# Patient Record
Sex: MALE | Race: WHITE | NOT HISPANIC OR LATINO | Employment: FULL TIME | URBAN - METROPOLITAN AREA
[De-identification: names, ages, dates, MRNs, and addresses within clinical notes are randomized per-mention and may not be internally consistent; named-entity substitution may affect disease eponyms.]

---

## 2023-04-24 LAB
NIL(NEG) CONTROL SPOT COUNT: NORMAL
PANEL A SPOT COUNT: 1
PANEL B SPOT COUNT: 0
POS CONTROL SPOT COUNT: NORMAL
T-SPOT. TB INTERPRETATION: NEGATIVE

## 2023-05-24 LAB
APPEARANCE, URINE: CLEAR
BILIRUBIN, URINE: NEGATIVE
BLOOD, URINE: ABNORMAL
COLOR, URINE: YELLOW
GLUCOSE, URINE: NEGATIVE MG/DL
KETONES, URINE: NEGATIVE MG/DL
LEUKOCYTE ESTERASE, URINE: ABNORMAL
NITRITE, URINE: NEGATIVE
PH, URINE: 6 (ref 5–8)
PROTEIN, URINE: ABNORMAL MG/DL
RBC, URINE: 5 /HPF (ref 0–5)
SPECIFIC GRAVITY, URINE: 1.01 (ref 1–1.03)
UROBILINOGEN, URINE: <2 MG/DL (ref 0–1.9)
WBC, URINE: 14 /HPF (ref 0–5)

## 2023-05-31 LAB
APPEARANCE, URINE: CLEAR
BILIRUBIN, URINE: NEGATIVE
BLOOD, URINE: NEGATIVE
COLOR, URINE: YELLOW
GLUCOSE, URINE: NEGATIVE MG/DL
KETONES, URINE: NEGATIVE MG/DL
LEUKOCYTE ESTERASE, URINE: ABNORMAL
MUCUS, URINE: ABNORMAL /LPF
NITRITE, URINE: NEGATIVE
PH, URINE: 7 (ref 5–8)
PROTEIN, URINE: NEGATIVE MG/DL
RBC, URINE: 3 /HPF (ref 0–5)
SPECIFIC GRAVITY, URINE: 1.01 (ref 1–1.03)
UROBILINOGEN, URINE: <2 MG/DL (ref 0–1.9)
WBC, URINE: 15 /HPF (ref 0–5)

## 2023-06-07 LAB
APPEARANCE, URINE: CLEAR
BILIRUBIN, URINE: NEGATIVE
BLOOD, URINE: ABNORMAL
COLOR, URINE: YELLOW
GLUCOSE, URINE: NEGATIVE MG/DL
KETONES, URINE: NEGATIVE MG/DL
LEUKOCYTE ESTERASE, URINE: ABNORMAL
NITRITE, URINE: NEGATIVE
PH, URINE: 7 (ref 5–8)
PROTEIN, URINE: NEGATIVE MG/DL
RBC, URINE: 12 /HPF (ref 0–5)
SPECIFIC GRAVITY, URINE: 1.01 (ref 1–1.03)
SQUAMOUS EPITHELIAL CELLS, URINE: <1 /HPF
UROBILINOGEN, URINE: <2 MG/DL (ref 0–1.9)
WBC, URINE: 19 /HPF (ref 0–5)

## 2023-06-14 LAB
APPEARANCE, URINE: CLEAR
BILIRUBIN, URINE: NEGATIVE
BLOOD, URINE: ABNORMAL
COLOR, URINE: YELLOW
GLUCOSE, URINE: NEGATIVE MG/DL
KETONES, URINE: NEGATIVE MG/DL
LEUKOCYTE ESTERASE, URINE: ABNORMAL
MUCUS, URINE: ABNORMAL /LPF
NITRITE, URINE: NEGATIVE
PH, URINE: 7 (ref 5–8)
PROTEIN, URINE: NEGATIVE MG/DL
RBC, URINE: 18 /HPF (ref 0–5)
SPECIFIC GRAVITY, URINE: 1 (ref 1–1.03)
SQUAMOUS EPITHELIAL CELLS, URINE: <1 /HPF
UROBILINOGEN, URINE: <2 MG/DL (ref 0–1.9)
WBC, URINE: 20 /HPF (ref 0–5)

## 2023-06-21 LAB
APPEARANCE, URINE: CLEAR
BILIRUBIN, URINE: NEGATIVE
BLOOD, URINE: ABNORMAL
COLOR, URINE: YELLOW
GLUCOSE, URINE: NEGATIVE MG/DL
KETONES, URINE: NEGATIVE MG/DL
LEUKOCYTE ESTERASE, URINE: ABNORMAL
NITRITE, URINE: NEGATIVE
PH, URINE: 8 (ref 5–8)
PROTEIN, URINE: NEGATIVE MG/DL
RBC, URINE: 6 /HPF (ref 0–5)
SPECIFIC GRAVITY, URINE: 1 (ref 1–1.03)
UROBILINOGEN, URINE: <2 MG/DL (ref 0–1.9)
WBC, URINE: 11 /HPF (ref 0–5)

## 2023-06-28 LAB
APPEARANCE, URINE: CLEAR
BILIRUBIN, URINE: NEGATIVE
BLOOD, URINE: ABNORMAL
COLOR, URINE: YELLOW
GLUCOSE, URINE: NEGATIVE MG/DL
KETONES, URINE: NEGATIVE MG/DL
LEUKOCYTE ESTERASE, URINE: ABNORMAL
NITRITE, URINE: NEGATIVE
PH, URINE: 6 (ref 5–8)
PROTEIN, URINE: NEGATIVE MG/DL
RBC, URINE: 141 /HPF (ref 0–5)
SPECIFIC GRAVITY, URINE: 1.01 (ref 1–1.03)
UROBILINOGEN, URINE: <2 MG/DL (ref 0–1.9)
WBC, URINE: 30 /HPF (ref 0–5)

## 2023-08-14 LAB
ALANINE AMINOTRANSFERASE (SGPT) (U/L) IN SER/PLAS: 26 U/L (ref 10–52)
ALBUMIN (G/DL) IN SER/PLAS: 5 G/DL (ref 3.4–5)
ALKALINE PHOSPHATASE (U/L) IN SER/PLAS: 86 U/L (ref 33–120)
ANION GAP IN SER/PLAS: 14 MMOL/L (ref 10–20)
ASPARTATE AMINOTRANSFERASE (SGOT) (U/L) IN SER/PLAS: 22 U/L (ref 9–39)
BILIRUBIN TOTAL (MG/DL) IN SER/PLAS: 0.6 MG/DL (ref 0–1.2)
CALCIUM (MG/DL) IN SER/PLAS: 9.7 MG/DL (ref 8.6–10.6)
CARBON DIOXIDE, TOTAL (MMOL/L) IN SER/PLAS: 29 MMOL/L (ref 21–32)
CHLORIDE (MMOL/L) IN SER/PLAS: 102 MMOL/L (ref 98–107)
CREATININE (MG/DL) IN SER/PLAS: 0.57 MG/DL (ref 0.5–1.3)
ERYTHROCYTE DISTRIBUTION WIDTH (RATIO) BY AUTOMATED COUNT: 12.2 % (ref 11.5–14.5)
ERYTHROCYTE MEAN CORPUSCULAR HEMOGLOBIN CONCENTRATION (G/DL) BY AUTOMATED: 34.6 G/DL (ref 32–36)
ERYTHROCYTE MEAN CORPUSCULAR VOLUME (FL) BY AUTOMATED COUNT: 103 FL (ref 80–100)
ERYTHROCYTES (10*6/UL) IN BLOOD BY AUTOMATED COUNT: 4.74 X10E12/L (ref 4.5–5.9)
GFR MALE: >90 ML/MIN/1.73M2
GLUCOSE (MG/DL) IN SER/PLAS: 85 MG/DL (ref 74–99)
HEMATOCRIT (%) IN BLOOD BY AUTOMATED COUNT: 48.6 % (ref 41–52)
HEMOGLOBIN (G/DL) IN BLOOD: 16.8 G/DL (ref 13.5–17.5)
LEUKOCYTES (10*3/UL) IN BLOOD BY AUTOMATED COUNT: 8.8 X10E9/L (ref 4.4–11.3)
NRBC (PER 100 WBCS) BY AUTOMATED COUNT: 0 /100 WBC (ref 0–0)
PLATELETS (10*3/UL) IN BLOOD AUTOMATED COUNT: 231 X10E9/L (ref 150–450)
POTASSIUM (MMOL/L) IN SER/PLAS: 4.2 MMOL/L (ref 3.5–5.3)
PROTEIN TOTAL: 7.7 G/DL (ref 6.4–8.2)
SODIUM (MMOL/L) IN SER/PLAS: 141 MMOL/L (ref 136–145)
UREA NITROGEN (MG/DL) IN SER/PLAS: 10 MG/DL (ref 6–23)

## 2023-09-27 PROBLEM — R10.9 FLANK PAIN: Status: ACTIVE | Noted: 2023-09-27

## 2023-09-27 PROBLEM — C68.9 UROTHELIAL CARCINOMA (MULTI): Status: ACTIVE | Noted: 2023-09-27

## 2023-09-27 PROBLEM — N32.89 BLADDER MASS: Status: ACTIVE | Noted: 2023-09-27

## 2023-09-27 PROBLEM — I10 SYSTOLIC HYPERTENSION: Status: ACTIVE | Noted: 2023-09-27

## 2023-09-27 PROBLEM — N20.0 CALCULUS OF KIDNEY: Status: ACTIVE | Noted: 2023-09-27

## 2023-09-27 PROBLEM — R31.0 GROSS HEMATURIA: Status: ACTIVE | Noted: 2023-09-27

## 2023-09-27 PROBLEM — N32.81 OVERACTIVE BLADDER: Status: ACTIVE | Noted: 2023-09-27

## 2023-09-27 RX ORDER — LOSARTAN POTASSIUM 100 MG/1
100 TABLET ORAL DAILY
COMMUNITY

## 2023-09-27 RX ORDER — AMLODIPINE BESYLATE 10 MG/1
10 TABLET ORAL DAILY
COMMUNITY

## 2023-09-27 RX ORDER — CHLORHEXIDINE GLUCONATE ORAL RINSE 1.2 MG/ML
15 SOLUTION DENTAL
Status: ON HOLD | COMMUNITY
End: 2023-10-02

## 2023-09-27 RX ORDER — CHLORHEXIDINE GLUCONATE 40 MG/ML
SOLUTION TOPICAL
Status: ON HOLD | COMMUNITY
End: 2023-10-02

## 2023-09-27 RX ORDER — METOPROLOL SUCCINATE 50 MG/1
50 TABLET, EXTENDED RELEASE ORAL DAILY
COMMUNITY

## 2023-10-01 ENCOUNTER — HOSPITAL ENCOUNTER (INPATIENT)
Facility: HOSPITAL | Age: 58
LOS: 5 days | Discharge: HOME | DRG: 863 | End: 2023-10-06
Attending: EMERGENCY MEDICINE | Admitting: STUDENT IN AN ORGANIZED HEALTH CARE EDUCATION/TRAINING PROGRAM
Payer: COMMERCIAL

## 2023-10-01 ENCOUNTER — APPOINTMENT (OUTPATIENT)
Dept: RADIOLOGY | Facility: HOSPITAL | Age: 58
DRG: 863 | End: 2023-10-01
Payer: COMMERCIAL

## 2023-10-01 DIAGNOSIS — N32.89 BLADDER MASS: ICD-10-CM

## 2023-10-01 DIAGNOSIS — R50.82 POSTSURGICAL FEVER: ICD-10-CM

## 2023-10-01 DIAGNOSIS — T81.89XA LYMPHOCELE AFTER SURGICAL PROCEDURE: Primary | ICD-10-CM

## 2023-10-01 DIAGNOSIS — R78.81 BACTEREMIA: ICD-10-CM

## 2023-10-01 DIAGNOSIS — I89.8 LYMPHOCELE AFTER SURGICAL PROCEDURE: Primary | ICD-10-CM

## 2023-10-01 LAB
ALBUMIN SERPL BCP-MCNC: 3.3 G/DL (ref 3.4–5)
ANION GAP SERPL CALC-SCNC: 17 MMOL/L
APPEARANCE UR: ABNORMAL
BACTERIA #/AREA URNS AUTO: ABNORMAL /HPF
BASOPHILS # BLD AUTO: 0.04 X10*3/UL (ref 0–0.1)
BASOPHILS NFR BLD AUTO: 0.3 %
BILIRUB UR STRIP.AUTO-MCNC: NEGATIVE MG/DL
BUN SERPL-MCNC: 7 MG/DL (ref 6–23)
CALCIUM SERPL-MCNC: 8.5 MG/DL (ref 8.6–10.6)
CHLORIDE SERPL-SCNC: 95 MMOL/L (ref 98–107)
CO2 SERPL-SCNC: 21 MMOL/L (ref 21–32)
COLOR UR: ABNORMAL
CREAT SERPL-MCNC: 0.68 MG/DL (ref 0.5–1.3)
EOSINOPHIL # BLD AUTO: 0.01 X10*3/UL (ref 0–0.7)
EOSINOPHIL NFR BLD AUTO: 0.1 %
ERYTHROCYTE [DISTWIDTH] IN BLOOD BY AUTOMATED COUNT: 11.4 % (ref 11.5–14.5)
FLUAV RNA RESP QL NAA+PROBE: NOT DETECTED
FLUBV RNA RESP QL NAA+PROBE: NOT DETECTED
GFR SERPL CREATININE-BSD FRML MDRD: >90 ML/MIN/1.73M*2
GLUCOSE SERPL-MCNC: 96 MG/DL (ref 74–99)
GLUCOSE UR STRIP.AUTO-MCNC: NEGATIVE MG/DL
HCT VFR BLD AUTO: 36.1 % (ref 41–52)
HGB BLD-MCNC: 13.1 G/DL (ref 13.5–17.5)
IMM GRANULOCYTES # BLD AUTO: 0.09 X10*3/UL (ref 0–0.7)
IMM GRANULOCYTES NFR BLD AUTO: 0.7 % (ref 0–0.9)
KETONES UR STRIP.AUTO-MCNC: ABNORMAL MG/DL
LEUKOCYTE ESTERASE UR QL STRIP.AUTO: ABNORMAL
LYMPHOCYTES # BLD AUTO: 0.45 X10*3/UL (ref 1.2–4.8)
LYMPHOCYTES NFR BLD AUTO: 3.7 %
MCH RBC QN AUTO: 34.7 PG (ref 26–34)
MCHC RBC AUTO-ENTMCNC: 36.3 G/DL (ref 32–36)
MCV RBC AUTO: 96 FL (ref 80–100)
MONOCYTES # BLD AUTO: 0.7 X10*3/UL (ref 0.1–1)
MONOCYTES NFR BLD AUTO: 5.8 %
MUCOUS THREADS #/AREA URNS AUTO: ABNORMAL /LPF
NEUTROPHILS # BLD AUTO: 10.79 X10*3/UL (ref 1.2–7.7)
NEUTROPHILS NFR BLD AUTO: 89.4 %
NITRITE UR QL STRIP.AUTO: NEGATIVE
NRBC BLD-RTO: 0 /100 WBCS (ref 0–0)
PH UR STRIP.AUTO: 6 [PH]
PHOSPHATE SERPL-MCNC: 2.1 MG/DL (ref 2.5–4.9)
PLATELET # BLD AUTO: 372 X10*3/UL (ref 150–450)
PMV BLD AUTO: 8.6 FL (ref 7.5–11.5)
POTASSIUM SERPL-SCNC: 4.4 MMOL/L (ref 3.5–5.3)
PROT UR STRIP.AUTO-MCNC: ABNORMAL MG/DL
RBC # BLD AUTO: 3.78 X10*6/UL (ref 4.5–5.9)
RBC # UR STRIP.AUTO: ABNORMAL /UL
RBC #/AREA URNS AUTO: >20 /HPF
SARS-COV-2 ORF1AB RESP QL NAA+PROBE: NOT DETECTED
SODIUM SERPL-SCNC: 129 MMOL/L (ref 136–145)
SP GR UR STRIP.AUTO: 1.02
UROBILINOGEN UR STRIP.AUTO-MCNC: 2 MG/DL
WBC # BLD AUTO: 12.1 X10*3/UL (ref 4.4–11.3)
WBC #/AREA URNS AUTO: ABNORMAL /HPF

## 2023-10-01 PROCEDURE — 2580000001 HC RX 258 IV SOLUTIONS: Performed by: STUDENT IN AN ORGANIZED HEALTH CARE EDUCATION/TRAINING PROGRAM

## 2023-10-01 PROCEDURE — 81001 URINALYSIS AUTO W/SCOPE: CPT

## 2023-10-01 PROCEDURE — 2500000004 HC RX 250 GENERAL PHARMACY W/ HCPCS (ALT 636 FOR OP/ED)

## 2023-10-01 PROCEDURE — 2500000001 HC RX 250 WO HCPCS SELF ADMINISTERED DRUGS (ALT 637 FOR MEDICARE OP): Performed by: STUDENT IN AN ORGANIZED HEALTH CARE EDUCATION/TRAINING PROGRAM

## 2023-10-01 PROCEDURE — 2580000001 HC RX 258 IV SOLUTIONS

## 2023-10-01 PROCEDURE — 74177 CT ABD & PELVIS W/CONTRAST: CPT | Performed by: RADIOLOGY

## 2023-10-01 PROCEDURE — 71046 X-RAY EXAM CHEST 2 VIEWS: CPT | Performed by: RADIOLOGY

## 2023-10-01 PROCEDURE — 87040 BLOOD CULTURE FOR BACTERIA: CPT | Performed by: EMERGENCY MEDICINE

## 2023-10-01 PROCEDURE — 36415 COLL VENOUS BLD VENIPUNCTURE: CPT | Performed by: EMERGENCY MEDICINE

## 2023-10-01 PROCEDURE — 2500000001 HC RX 250 WO HCPCS SELF ADMINISTERED DRUGS (ALT 637 FOR MEDICARE OP)

## 2023-10-01 PROCEDURE — 87186 SC STD MICRODIL/AGAR DIL: CPT

## 2023-10-01 PROCEDURE — 2500000004 HC RX 250 GENERAL PHARMACY W/ HCPCS (ALT 636 FOR OP/ED): Performed by: STUDENT IN AN ORGANIZED HEALTH CARE EDUCATION/TRAINING PROGRAM

## 2023-10-01 PROCEDURE — 1170000001 HC PRIVATE ONCOLOGY ROOM DAILY

## 2023-10-01 PROCEDURE — 74177 CT ABD & PELVIS W/CONTRAST: CPT

## 2023-10-01 PROCEDURE — 96374 THER/PROPH/DIAG INJ IV PUSH: CPT | Mod: 59

## 2023-10-01 PROCEDURE — 87635 SARS-COV-2 COVID-19 AMP PRB: CPT

## 2023-10-01 PROCEDURE — 96360 HYDRATION IV INFUSION INIT: CPT

## 2023-10-01 PROCEDURE — 71046 X-RAY EXAM CHEST 2 VIEWS: CPT | Mod: FY

## 2023-10-01 PROCEDURE — 87636 SARSCOV2 & INF A&B AMP PRB: CPT

## 2023-10-01 PROCEDURE — 99285 EMERGENCY DEPT VISIT HI MDM: CPT | Mod: 25

## 2023-10-01 PROCEDURE — 80069 RENAL FUNCTION PANEL: CPT

## 2023-10-01 PROCEDURE — 99285 EMERGENCY DEPT VISIT HI MDM: CPT | Performed by: EMERGENCY MEDICINE

## 2023-10-01 PROCEDURE — 2550000001 HC RX 255 CONTRASTS: Performed by: EMERGENCY MEDICINE

## 2023-10-01 PROCEDURE — 99284 EMERGENCY DEPT VISIT MOD MDM: CPT | Performed by: EMERGENCY MEDICINE

## 2023-10-01 PROCEDURE — 85025 COMPLETE CBC W/AUTO DIFF WBC: CPT

## 2023-10-01 PROCEDURE — 36415 COLL VENOUS BLD VENIPUNCTURE: CPT

## 2023-10-01 RX ORDER — AMLODIPINE BESYLATE 10 MG/1
10 TABLET ORAL DAILY
Status: DISCONTINUED | OUTPATIENT
Start: 2023-10-01 | End: 2023-10-06 | Stop reason: HOSPADM

## 2023-10-01 RX ORDER — DOCUSATE SODIUM 100 MG/1
100 CAPSULE, LIQUID FILLED ORAL 2 TIMES DAILY
Status: DISCONTINUED | OUTPATIENT
Start: 2023-10-01 | End: 2023-10-06 | Stop reason: HOSPADM

## 2023-10-01 RX ORDER — METOPROLOL SUCCINATE 50 MG/1
50 TABLET, EXTENDED RELEASE ORAL DAILY
Status: DISCONTINUED | OUTPATIENT
Start: 2023-10-01 | End: 2023-10-06 | Stop reason: HOSPADM

## 2023-10-01 RX ORDER — VANCOMYCIN HYDROCHLORIDE 750 MG/150ML
1500 INJECTION, SOLUTION INTRAVENOUS ONCE
Status: COMPLETED | OUTPATIENT
Start: 2023-10-01 | End: 2023-10-01

## 2023-10-01 RX ORDER — ENOXAPARIN SODIUM 100 MG/ML
40 INJECTION SUBCUTANEOUS EVERY 12 HOURS
Status: DISCONTINUED | OUTPATIENT
Start: 2023-10-01 | End: 2023-10-06

## 2023-10-01 RX ORDER — ACETAMINOPHEN 325 MG/1
650 TABLET ORAL EVERY 4 HOURS PRN
Status: DISCONTINUED | OUTPATIENT
Start: 2023-10-01 | End: 2023-10-06 | Stop reason: HOSPADM

## 2023-10-01 RX ORDER — LOSARTAN POTASSIUM 50 MG/1
100 TABLET ORAL DAILY
Status: DISCONTINUED | OUTPATIENT
Start: 2023-10-01 | End: 2023-10-06 | Stop reason: HOSPADM

## 2023-10-01 RX ORDER — SODIUM CHLORIDE, SODIUM LACTATE, POTASSIUM CHLORIDE, CALCIUM CHLORIDE 600; 310; 30; 20 MG/100ML; MG/100ML; MG/100ML; MG/100ML
100 INJECTION, SOLUTION INTRAVENOUS CONTINUOUS
Status: DISCONTINUED | OUTPATIENT
Start: 2023-10-01 | End: 2023-10-03

## 2023-10-01 RX ORDER — POLYETHYLENE GLYCOL 3350 17 G/17G
17 POWDER, FOR SOLUTION ORAL DAILY
Status: DISCONTINUED | OUTPATIENT
Start: 2023-10-01 | End: 2023-10-06 | Stop reason: HOSPADM

## 2023-10-01 RX ADMIN — SODIUM CHLORIDE, POTASSIUM CHLORIDE, SODIUM LACTATE AND CALCIUM CHLORIDE 100 ML/HR: 600; 310; 30; 20 INJECTION, SOLUTION INTRAVENOUS at 20:32

## 2023-10-01 RX ADMIN — DOCUSATE SODIUM 100 MG: 100 CAPSULE, LIQUID FILLED ORAL at 20:16

## 2023-10-01 RX ADMIN — IOHEXOL 75 ML: 350 INJECTION, SOLUTION INTRAVENOUS at 17:02

## 2023-10-01 RX ADMIN — POLYETHYLENE GLYCOL 3350 17 G: 17 POWDER, FOR SOLUTION ORAL at 18:10

## 2023-10-01 RX ADMIN — PIPERACILLIN SODIUM AND TAZOBACTAM SODIUM 3.38 G: 3; .375 INJECTION, SOLUTION INTRAVENOUS at 17:44

## 2023-10-01 RX ADMIN — SODIUM CHLORIDE, POTASSIUM CHLORIDE, SODIUM LACTATE AND CALCIUM CHLORIDE 1000 ML: 600; 310; 30; 20 INJECTION, SOLUTION INTRAVENOUS at 16:11

## 2023-10-01 RX ADMIN — AMLODIPINE BESYLATE 10 MG: 10 TABLET ORAL at 18:10

## 2023-10-01 RX ADMIN — LOSARTAN POTASSIUM 100 MG: 50 TABLET, FILM COATED ORAL at 18:10

## 2023-10-01 RX ADMIN — VANCOMYCIN HYDROCHLORIDE 1500 MG: 750 INJECTION, SOLUTION INTRAVENOUS at 18:13

## 2023-10-01 RX ADMIN — METOPROLOL SUCCINATE 50 MG: 50 TABLET, EXTENDED RELEASE ORAL at 18:10

## 2023-10-01 RX ADMIN — ENOXAPARIN SODIUM 40 MG: 40 INJECTION SUBCUTANEOUS at 18:10

## 2023-10-01 RX ADMIN — ACETAMINOPHEN 650 MG: 325 TABLET, FILM COATED ORAL at 18:10

## 2023-10-01 SDOH — SOCIAL STABILITY: SOCIAL INSECURITY: ABUSE: ADULT

## 2023-10-01 SDOH — SOCIAL STABILITY: SOCIAL INSECURITY: HAS ANYONE EVER THREATENED TO HURT YOUR FAMILY OR YOUR PETS?: NO

## 2023-10-01 SDOH — SOCIAL STABILITY: SOCIAL INSECURITY: ARE THERE ANY APPARENT SIGNS OF INJURIES/BEHAVIORS THAT COULD BE RELATED TO ABUSE/NEGLECT?: NO

## 2023-10-01 SDOH — SOCIAL STABILITY: SOCIAL INSECURITY: DOES ANYONE TRY TO KEEP YOU FROM HAVING/CONTACTING OTHER FRIENDS OR DOING THINGS OUTSIDE YOUR HOME?: NO

## 2023-10-01 SDOH — SOCIAL STABILITY: SOCIAL INSECURITY: HAVE YOU HAD THOUGHTS OF HARMING ANYONE ELSE?: NO

## 2023-10-01 SDOH — SOCIAL STABILITY: SOCIAL INSECURITY: ARE YOU OR HAVE YOU BEEN THREATENED OR ABUSED PHYSICALLY, EMOTIONALLY, OR SEXUALLY BY ANYONE?: NO

## 2023-10-01 SDOH — SOCIAL STABILITY: SOCIAL INSECURITY: DO YOU FEEL ANYONE HAS EXPLOITED OR TAKEN ADVANTAGE OF YOU FINANCIALLY OR OF YOUR PERSONAL PROPERTY?: NO

## 2023-10-01 SDOH — SOCIAL STABILITY: SOCIAL INSECURITY: DO YOU FEEL UNSAFE GOING BACK TO THE PLACE WHERE YOU ARE LIVING?: NO

## 2023-10-01 ASSESSMENT — ACTIVITIES OF DAILY LIVING (ADL)
JUDGMENT_ADEQUATE_SAFELY_COMPLETE_DAILY_ACTIVITIES: YES
GROOMING: INDEPENDENT
BATHING: INDEPENDENT
HEARING - RIGHT EAR: FUNCTIONAL
ADEQUATE_TO_COMPLETE_ADL: YES
GROOMING: INDEPENDENT
JUDGMENT_ADEQUATE_SAFELY_COMPLETE_DAILY_ACTIVITIES: YES
ADEQUATE_TO_COMPLETE_ADL: YES
DRESSING YOURSELF: INDEPENDENT
TOILETING: INDEPENDENT
TOILETING: INDEPENDENT
PATIENT'S MEMORY ADEQUATE TO SAFELY COMPLETE DAILY ACTIVITIES?: YES
FEEDING YOURSELF: INDEPENDENT
WALKS IN HOME: INDEPENDENT
FEEDING YOURSELF: INDEPENDENT
HEARING - LEFT EAR: FUNCTIONAL
WALKS IN HOME: INDEPENDENT
DRESSING YOURSELF: INDEPENDENT
HEARING - LEFT EAR: FUNCTIONAL
BATHING: INDEPENDENT
PATIENT'S MEMORY ADEQUATE TO SAFELY COMPLETE DAILY ACTIVITIES?: YES
HEARING - RIGHT EAR: FUNCTIONAL

## 2023-10-01 ASSESSMENT — COGNITIVE AND FUNCTIONAL STATUS - GENERAL
PATIENT BASELINE BEDBOUND: NO
MOBILITY SCORE: 22
CLIMB 3 TO 5 STEPS WITH RAILING: A LITTLE
WALKING IN HOSPITAL ROOM: A LITTLE
DAILY ACTIVITIY SCORE: 24

## 2023-10-01 ASSESSMENT — PATIENT HEALTH QUESTIONNAIRE - PHQ9
SUM OF ALL RESPONSES TO PHQ9 QUESTIONS 1 & 2: 0
2. FEELING DOWN, DEPRESSED OR HOPELESS: NOT AT ALL
1. LITTLE INTEREST OR PLEASURE IN DOING THINGS: NOT AT ALL

## 2023-10-01 ASSESSMENT — COLUMBIA-SUICIDE SEVERITY RATING SCALE - C-SSRS
6. HAVE YOU EVER DONE ANYTHING, STARTED TO DO ANYTHING, OR PREPARED TO DO ANYTHING TO END YOUR LIFE?: NO
1. IN THE PAST MONTH, HAVE YOU WISHED YOU WERE DEAD OR WISHED YOU COULD GO TO SLEEP AND NOT WAKE UP?: NO
2. HAVE YOU ACTUALLY HAD ANY THOUGHTS OF KILLING YOURSELF?: NO

## 2023-10-01 ASSESSMENT — ENCOUNTER SYMPTOMS
FEVER: 1
HEMATURIA: 1
CHILLS: 1
NEUROLOGICAL NEGATIVE: 1
RESPIRATORY NEGATIVE: 1
GASTROINTESTINAL NEGATIVE: 1

## 2023-10-01 ASSESSMENT — LIFESTYLE VARIABLES
HOW MANY STANDARD DRINKS CONTAINING ALCOHOL DO YOU HAVE ON A TYPICAL DAY: PATIENT DOES NOT DRINK
AUDIT-C TOTAL SCORE: 0
HOW OFTEN DO YOU HAVE 6 OR MORE DRINKS ON ONE OCCASION: NEVER
SKIP TO QUESTIONS 9-10: 1
HOW OFTEN DO YOU HAVE A DRINK CONTAINING ALCOHOL: NEVER
AUDIT-C TOTAL SCORE: 0

## 2023-10-01 ASSESSMENT — PAIN - FUNCTIONAL ASSESSMENT
PAIN_FUNCTIONAL_ASSESSMENT: 0-10
PAIN_FUNCTIONAL_ASSESSMENT: 0-10

## 2023-10-01 ASSESSMENT — PAIN SCALES - GENERAL: PAINLEVEL_OUTOF10: 0 - NO PAIN

## 2023-10-01 NOTE — ED TRIAGE NOTES
S/p Bladder removal 9/20. Pt bernabe fever that started this morning and hematuria in urostomy that started yesterday

## 2023-10-01 NOTE — ED PROVIDER NOTES
HPI   Chief Complaint   Patient presents with    Fever       Patient is a 57-year-old male with past medical history of HTN, HLD, OA, nephrolithiasis now postop day 10 from TURBT with cystectomy and ileal conduit creation presenting with postsurgical fever.  Patient endorses he was generally doing well postsurgical but took his temperature at home that was 102.4.  Patient otherwise endorses discoloration of the urine in his stoma bag but no other current symptoms.  Patient does not endorse chills, malaise, cough, congestion, nausea, abdominal pain, or other symptoms.          History provided by:  Patient and spouse   used: No                        No data recorded                Patient History   No past medical history on file.  No past surgical history on file.  No family history on file.  Social History     Tobacco Use    Smoking status: Not on file    Smokeless tobacco: Not on file   Substance Use Topics    Alcohol use: Not on file    Drug use: Not on file       Physical Exam   ED Triage Vitals [10/01/23 1225]   Temp Heart Rate Resp BP   36.7 °C (98.1 °F) 99 18 130/82      SpO2 Temp Source Heart Rate Source Patient Position   96 % Oral Monitor Sitting      BP Location FiO2 (%)     Left arm --       Physical Exam  Constitutional:       General: He is not in acute distress.     Appearance: Normal appearance. He is not ill-appearing.   HENT:      Head: Normocephalic and atraumatic.      Nose: No congestion or rhinorrhea.      Mouth/Throat:      Mouth: Mucous membranes are moist.   Eyes:      Extraocular Movements: Extraocular movements intact.   Cardiovascular:      Rate and Rhythm: Normal rate and regular rhythm.      Pulses: Normal pulses.   Pulmonary:      Effort: Pulmonary effort is normal. No respiratory distress.      Breath sounds: Normal breath sounds. No stridor. No wheezing or rhonchi.   Abdominal:      General: There is no distension.      Palpations: Abdomen is soft.       Tenderness: There is no abdominal tenderness. There is no guarding or rebound.      Comments: 1 stage ileal conduit in place.  Reddish color of stoma site reportedly unchanged.  Tea-colored possibly blood-tinged urine in stoma bag.  Bilateral conduits appropriately extending into bag.  Patient otherwise with no tenderness palpation in abdomen.  No CVA tenderness.  Negative Chaidez sign.   Musculoskeletal:         General: Normal range of motion.   Skin:     General: Skin is warm and dry.      Capillary Refill: Capillary refill takes less than 2 seconds.   Neurological:      General: No focal deficit present.      Mental Status: He is alert and oriented to person, place, and time.   Psychiatric:         Mood and Affect: Mood normal.         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  Patient with change in urine color suspicious for UTI postsurgical infection but otherwise does not have obvious source of infection.  Patient with no symptoms of respiratory infection but screened with flu, COVID that were negative and chest x-ray that was evidence of pneumonia.  Blood cultures were sent which were pending at time of admission.  Urinalysis was sent from [facility] with moderate blood, moderate leuk esterase but difficult to interpret as unable to straight cath patient given 1 stage ileal conduit.  Urology was consulted for postsurgical recommendations and CT abdomen pelvis was obtained which was significant for abdominal fluid collection of undetermined sterility.  Given concern for inability to rule out postsurgical abscess/infection, patient was started on broad-spectrum antibiotics of piperacillin/tazobactam and vancomycin at urology recommendation.  Urology recommended admission for further observation  and patient mated to their service.        Procedure  Procedures     Andres Ray MD  Resident  10/02/23 5056

## 2023-10-01 NOTE — CONSULTS
"Reason For Consult  POD10 cystectomy with fever and increased hematuria    History Of Present Illness  Rolando Eason is a 57 y.o. male POD10 following robotic cystectomy w/ IC creation w/ Dr. Merrill on 9/20 presenting with recorded fever of 38.3 and increased hematuria. Patient was originally discharged home on POD4 with no concerns and is scheduled to follow up with Dr. Merrill on 10/4. Urology was consulted for recommendations in workup.     Patient states that he began to feel fatigued yesterday evening and struggled sleeping overnight. This am the patient had fevers with mild nausea. Denies vomiting, abdominal pain. Patient presented to the ED and has cont'd to be fatigued with occasional fevers. Still denies abdominal pain.      Past Medical History  HTN, HLD, OA, kidney stones, bladder ca    Surgical History  TURBT, cystectomy     Social History  He has no history on file for tobacco use, alcohol use, and drug use.    Family History  No family history on file.     Allergies  Lisinopril    Review of Systems  Review of Systems   Constitutional:  Positive for chills and fever.   HENT: Negative.     Respiratory: Negative.     Gastrointestinal: Negative.    Genitourinary:  Positive for hematuria.   Skin: Negative.    Neurological: Negative.          Physical Exam  General: Laying in bed. NAD.   Eyes: EOMI  ENMT: no apparent injury, no lesions seen, MMM  Head/neck: NCAT  Cardiac: regular rate in chart  Pulm: normal respiratory effort  GI: soft, NT/ND, no masses palpated  :   Msk: [place]GAD[place]O  Extremities: normal extremities  Skin: warm, dry, no lesions noted  Neuro: AOx3  Psych: appropriate mood and behavior      Last Recorded Vitals  Blood pressure 158/86, pulse 84, temperature 38.3 °C (100.9 °F), resp. rate 16, height 1.753 m (5' 9\"), weight 90.7 kg (200 lb), SpO2 96 %.    Relevant Results  Results for orders placed or performed during the hospital encounter of 10/01/23 (from the past 24 hour(s))   CBC and Auto " Differential   Result Value Ref Range    WBC 12.1 (H) 4.4 - 11.3 x10*3/uL    nRBC 0.0 0.0 - 0.0 /100 WBCs    RBC 3.78 (L) 4.50 - 5.90 x10*6/uL    Hemoglobin 13.1 (L) 13.5 - 17.5 g/dL    Hematocrit 36.1 (L) 41.0 - 52.0 %    MCV 96 80 - 100 fL    MCH 34.7 (H) 26.0 - 34.0 pg    MCHC 36.3 (H) 32.0 - 36.0 g/dL    RDW 11.4 (L) 11.5 - 14.5 %    Platelets 372 150 - 450 x10*3/uL    MPV 8.6 7.5 - 11.5 fL    Neutrophils % 89.4 40.0 - 80.0 %    Immature Granulocytes %, Automated 0.7 0.0 - 0.9 %    Lymphocytes % 3.7 13.0 - 44.0 %    Monocytes % 5.8 2.0 - 10.0 %    Eosinophils % 0.1 0.0 - 6.0 %    Basophils % 0.3 0.0 - 2.0 %    Neutrophils Absolute 10.79 (H) 1.20 - 7.70 x10*3/uL    Immature Granulocytes Absolute, Automated 0.09 0.00 - 0.70 x10*3/uL    Lymphocytes Absolute 0.45 (L) 1.20 - 4.80 x10*3/uL    Monocytes Absolute 0.70 0.10 - 1.00 x10*3/uL    Eosinophils Absolute 0.01 0.00 - 0.70 x10*3/uL    Basophils Absolute 0.04 0.00 - 0.10 x10*3/uL   Renal function panel   Result Value Ref Range    Glucose 96 74 - 99 mg/dL    Sodium 129 (L) 136 - 145 mmol/L    Potassium 4.4 3.5 - 5.3 mmol/L    Chloride 95 (L) 98 - 107 mmol/L    Bicarbonate 21 21 - 32 mmol/L    Anion Gap 17 mmol/L    Urea Nitrogen 7 6 - 23 mg/dL    Creatinine 0.68 0.50 - 1.30 mg/dL    eGFR >90 >60 mL/min/1.73m*2    Calcium 8.5 (L) 8.6 - 10.6 mg/dL    Phosphorus 2.1 (L) 2.5 - 4.9 mg/dL    Albumin 3.3 (L) 3.4 - 5.0 g/dL   Urinalysis with Reflex Microscopic   Result Value Ref Range    Color, Urine Angie (N) Straw, Yellow    Appearance, Urine Hazy (N) Clear    Specific Gravity, Urine 1.019 1.005 - 1.035    pH, Urine 6.0 5.0, 5.5, 6.0, 6.5, 7.0, 7.5, 8.0    Protein, Urine 100 (2+) (N) NEGATIVE mg/dL    Glucose, Urine NEGATIVE NEGATIVE mg/dL    Blood, Urine MODERATE (2+) (A) NEGATIVE    Ketones, Urine 20 (1+) (A) NEGATIVE mg/dL    Bilirubin, Urine NEGATIVE NEGATIVE    Urobilinogen, Urine 2.0 (N) <2.0 mg/dL    Nitrite, Urine NEGATIVE NEGATIVE    Leukocyte Esterase, Urine  MODERATE (2+) (A) NEGATIVE   Urinalysis Microscopic Only   Result Value Ref Range    WBC, Urine 21-50 (A) 1-5, NONE /HPF    RBC, Urine >20 (A) NONE, 1-2, 3-5 /HPF    Bacteria, Urine 1+ (A) NONE SEEN /HPF    Mucus, Urine 4+ Reference range not established. /LPF   Sars-CoV-2 PCR, Symptomatic   Result Value Ref Range    Coronavirus 2019, PCR Not Detected Not Detected   Influenza A, and B PCR   Result Value Ref Range    Flu A Result Not Detected Not Detected    Flu B Result Not Detected Not Detected           Assessment/Plan     Rolando Eason is a 57 y.o. male POD10 following robotic cystectomy w/ IC creation w/ Dr. Merrill on 9/20 presenting with recorded fever of 38.3 and increased hematuria. Patient was originally discharged home on POD4 with no concerns and is scheduled to follow up with Dr. Merrill on 10/4. Urology was consulted for recommendations in workup.     CT abdomen/pelvis (10/1): Mildly rim enhancing fluid collection within the left hemipelvis measuring up to 4.3 cm immediately inferior left iliac bifurcation between the external iliac artery and vein, favored to represent a lymphocele. However, the sterility of the fluid connection cannot be determined by this exam alone.    Plan:  - Admit to urology for IVF and IV abx  - Pain control w/ tylenol  - Cont home losartan, amlodipine, metoprolol  - Reg diet  - Bowel regimen  - IVF 100ml/hr LR  - Start vanc/zosyn  - Cont lovenox ppx  - AM labs ordered    Discussed with attending, Dr. Garfield William MD

## 2023-10-01 NOTE — ED PROVIDER NOTES
I performed a history and physical examination of Rolando Eason and discussed his management with Dr. Ray.  I agree with the history, physical, assessment, and plan of care, with the following exceptions: None    I was present for the following procedures: None  Time Spent in Critical Care of the patient: None  Time spent in discussions with the patient and family: 35    57-year-old male with a history of bladder cancer status post cystectomy September 20, presents with fever at home.  He otherwise has no symptoms.  Denies any cough, congestion, sore throat, nausea vomiting or diarrhea, skin rashes, neck pain or neck stiffness.  ROS otherwise negative.  States he is not getting any chemo or radiation, not otherwise immunocompromised.  Has been having normal bowel movements.  Did note blood-tinged urine over the last 2 days, this is new for him.  Denies any discomfort in his belly.  On my exam patient is well-appearing in no acute distress.  No nuchal rigidity.  No edema or exudate to posterior oropharynx.  Clear to auscultation.  Abdomen soft nontender.  Ileostomy mucosa is pink and patent with clear blood-tinged urine.  Sepsis labs including blood cultures were ordered.  We will hold off on empiric antibiotics since patient is not immunocompromised and there is no clear source until we discussed with urology.    Alexander Becerra MD MPH       Alexander Becerra MD MPH  10/01/23 3399

## 2023-10-02 LAB
ANION GAP SERPL CALC-SCNC: 16 MMOL/L (ref 10–20)
BUN SERPL-MCNC: 6 MG/DL (ref 6–23)
CALCIUM SERPL-MCNC: 7.9 MG/DL (ref 8.6–10.6)
CHLORIDE SERPL-SCNC: 98 MMOL/L (ref 98–107)
CO2 SERPL-SCNC: 21 MMOL/L (ref 21–32)
CREAT SERPL-MCNC: 0.53 MG/DL (ref 0.5–1.3)
ERYTHROCYTE [DISTWIDTH] IN BLOOD BY AUTOMATED COUNT: 11.3 % (ref 11.5–14.5)
GFR SERPL CREATININE-BSD FRML MDRD: >90 ML/MIN/1.73M*2
GLUCOSE SERPL-MCNC: 97 MG/DL (ref 74–99)
HCT VFR BLD AUTO: 33.7 % (ref 41–52)
HGB BLD-MCNC: 12 G/DL (ref 13.5–17.5)
MCH RBC QN AUTO: 34.3 PG (ref 26–34)
MCHC RBC AUTO-ENTMCNC: 35.6 G/DL (ref 32–36)
MCV RBC AUTO: 96 FL (ref 80–100)
NRBC BLD-RTO: 0 /100 WBCS (ref 0–0)
PLATELET # BLD AUTO: 355 X10*3/UL (ref 150–450)
PMV BLD AUTO: 9.3 FL (ref 7.5–11.5)
POTASSIUM SERPL-SCNC: 3.8 MMOL/L (ref 3.5–5.3)
RBC # BLD AUTO: 3.5 X10*6/UL (ref 4.5–5.9)
SODIUM SERPL-SCNC: 131 MMOL/L (ref 136–145)
VANCOMYCIN SERPL-MCNC: 5.6 UG/ML (ref 5–20)
WBC # BLD AUTO: 8.2 X10*3/UL (ref 4.4–11.3)

## 2023-10-02 PROCEDURE — 1170000001 HC PRIVATE ONCOLOGY ROOM DAILY

## 2023-10-02 PROCEDURE — 36415 COLL VENOUS BLD VENIPUNCTURE: CPT | Performed by: STUDENT IN AN ORGANIZED HEALTH CARE EDUCATION/TRAINING PROGRAM

## 2023-10-02 PROCEDURE — 2500000001 HC RX 250 WO HCPCS SELF ADMINISTERED DRUGS (ALT 637 FOR MEDICARE OP)

## 2023-10-02 PROCEDURE — 85027 COMPLETE CBC AUTOMATED: CPT | Performed by: STUDENT IN AN ORGANIZED HEALTH CARE EDUCATION/TRAINING PROGRAM

## 2023-10-02 PROCEDURE — 2500000001 HC RX 250 WO HCPCS SELF ADMINISTERED DRUGS (ALT 637 FOR MEDICARE OP): Performed by: STUDENT IN AN ORGANIZED HEALTH CARE EDUCATION/TRAINING PROGRAM

## 2023-10-02 PROCEDURE — 96372 THER/PROPH/DIAG INJ SC/IM: CPT | Performed by: STUDENT IN AN ORGANIZED HEALTH CARE EDUCATION/TRAINING PROGRAM

## 2023-10-02 PROCEDURE — 82947 ASSAY GLUCOSE BLOOD QUANT: CPT | Performed by: STUDENT IN AN ORGANIZED HEALTH CARE EDUCATION/TRAINING PROGRAM

## 2023-10-02 PROCEDURE — 2500000004 HC RX 250 GENERAL PHARMACY W/ HCPCS (ALT 636 FOR OP/ED): Performed by: STUDENT IN AN ORGANIZED HEALTH CARE EDUCATION/TRAINING PROGRAM

## 2023-10-02 PROCEDURE — 2500000004 HC RX 250 GENERAL PHARMACY W/ HCPCS (ALT 636 FOR OP/ED)

## 2023-10-02 PROCEDURE — 80202 ASSAY OF VANCOMYCIN: CPT

## 2023-10-02 RX ORDER — ENOXAPARIN SODIUM 100 MG/ML
40 INJECTION SUBCUTANEOUS DAILY
COMMUNITY
Start: 2023-09-25 | End: 2023-10-23

## 2023-10-02 RX ORDER — BETAMETHASONE DIPROPIONATE 0.5 MG/G
1 OINTMENT TOPICAL DAILY PRN
COMMUNITY

## 2023-10-02 RX ORDER — ACETAMINOPHEN 325 MG/1
325 TABLET ORAL EVERY 6 HOURS PRN
COMMUNITY

## 2023-10-02 RX ORDER — ACETAMINOPHEN 325 MG/1
975 TABLET ORAL EVERY 6 HOURS PRN
Status: DISCONTINUED | OUTPATIENT
Start: 2023-10-02 | End: 2023-10-02

## 2023-10-02 RX ORDER — ACETAMINOPHEN 325 MG/1
650 TABLET ORAL EVERY 6 HOURS PRN
Status: DISCONTINUED | OUTPATIENT
Start: 2023-10-02 | End: 2023-10-05

## 2023-10-02 RX ADMIN — ENOXAPARIN SODIUM 40 MG: 40 INJECTION SUBCUTANEOUS at 05:50

## 2023-10-02 RX ADMIN — METOPROLOL SUCCINATE 50 MG: 50 TABLET, EXTENDED RELEASE ORAL at 17:22

## 2023-10-02 RX ADMIN — ENOXAPARIN SODIUM 40 MG: 40 INJECTION SUBCUTANEOUS at 17:23

## 2023-10-02 RX ADMIN — PIPERACILLIN SODIUM AND TAZOBACTAM SODIUM 3.38 G: 3; .375 INJECTION, SOLUTION INTRAVENOUS at 00:08

## 2023-10-02 RX ADMIN — VANCOMYCIN HYDROCHLORIDE 1500 MG: 1.5 INJECTION, POWDER, LYOPHILIZED, FOR SOLUTION INTRAVENOUS at 21:00

## 2023-10-02 RX ADMIN — AMLODIPINE BESYLATE 10 MG: 10 TABLET ORAL at 17:23

## 2023-10-02 RX ADMIN — ACETAMINOPHEN 650 MG: 325 TABLET, FILM COATED ORAL at 02:11

## 2023-10-02 RX ADMIN — VANCOMYCIN HYDROCHLORIDE 1250 MG: 1.25 INJECTION, POWDER, LYOPHILIZED, FOR SOLUTION INTRAVENOUS at 09:05

## 2023-10-02 RX ADMIN — PIPERACILLIN SODIUM AND TAZOBACTAM SODIUM 3.38 G: 3; .375 INJECTION, SOLUTION INTRAVENOUS at 17:23

## 2023-10-02 RX ADMIN — PIPERACILLIN SODIUM AND TAZOBACTAM SODIUM 3.38 G: 3; .375 INJECTION, SOLUTION INTRAVENOUS at 23:22

## 2023-10-02 RX ADMIN — LOSARTAN POTASSIUM 100 MG: 50 TABLET, FILM COATED ORAL at 17:22

## 2023-10-02 RX ADMIN — PIPERACILLIN SODIUM AND TAZOBACTAM SODIUM 3.38 G: 3; .375 INJECTION, SOLUTION INTRAVENOUS at 10:40

## 2023-10-02 ASSESSMENT — COGNITIVE AND FUNCTIONAL STATUS - GENERAL
DAILY ACTIVITIY SCORE: 24
MOBILITY SCORE: 24

## 2023-10-02 ASSESSMENT — PAIN - FUNCTIONAL ASSESSMENT
PAIN_FUNCTIONAL_ASSESSMENT: 0-10

## 2023-10-02 ASSESSMENT — PAIN SCALES - GENERAL
PAINLEVEL_OUTOF10: 4
PAINLEVEL_OUTOF10: 4
PAINLEVEL_OUTOF10: 3

## 2023-10-02 NOTE — PROGRESS NOTES
"Rolando Eason is a 57 y.o. male on day 1 of admission presenting with Postsurgical fever.    Subjective   No acute events overnight. Documented fever of 38.8 overnight. Patient reports feeling improved. Patient denies n/v, fever/chills, cp/sob, and worsening abdominal pain.         Objective     Physical Exam  General: Laying in bed. NAD.   Eyes: EOMI  ENMT: no apparent injury, no lesions seen, MMM  Head/neck: NCAT  Cardiac: regular rate in chart  Pulm: normal respiratory effort  GI: soft, NT, moderately distended, ostomy viable with stents in place, lap incisions stapled  : marta colored urine  Msk: DELGADILLO  Extremities: normal extremities  Skin: warm, dry, no lesions noted  Neuro: AOx3  Psych: appropriate mood and behavior     Last Recorded Vitals  Blood pressure 122/85, pulse 102, temperature 36.1 °C (97 °F), temperature source Temporal, resp. rate 18, height 1.753 m (5' 9.02\"), weight 90.7 kg (199 lb 15.3 oz), SpO2 95 %.  Intake/Output last 3 Shifts:  I/O last 3 completed shifts:  In: 946.7 (10.4 mL/kg) [I.V.:946.7 (10.4 mL/kg)]  Out: 200 (2.2 mL/kg) [Urine:200 (0.1 mL/kg/hr)]  Weight: 90.7 kg     Relevant Results  Scheduled medications  amLODIPine, 10 mg, oral, Daily  docusate sodium, 100 mg, oral, BID  enoxaparin, 40 mg, subcutaneous, q12h  losartan, 100 mg, oral, Daily  metoprolol succinate XL, 50 mg, oral, Daily  piperacillin-tazobactam, 3.375 g, intravenous, q6h  polyethylene glycol, 17 g, oral, Daily  vancomycin, 1,250 mg, intravenous, q12h      Continuous medications  lactated Ringer's, 100 mL/hr, Last Rate: 100 mL/hr (10/01/23 2200)      PRN medications  PRN medications: acetaminophen    Results for orders placed or performed during the hospital encounter of 10/01/23 (from the past 24 hour(s))   CBC and Auto Differential   Result Value Ref Range    WBC 12.1 (H) 4.4 - 11.3 x10*3/uL    nRBC 0.0 0.0 - 0.0 /100 WBCs    RBC 3.78 (L) 4.50 - 5.90 x10*6/uL    Hemoglobin 13.1 (L) 13.5 - 17.5 g/dL    Hematocrit " 36.1 (L) 41.0 - 52.0 %    MCV 96 80 - 100 fL    MCH 34.7 (H) 26.0 - 34.0 pg    MCHC 36.3 (H) 32.0 - 36.0 g/dL    RDW 11.4 (L) 11.5 - 14.5 %    Platelets 372 150 - 450 x10*3/uL    MPV 8.6 7.5 - 11.5 fL    Neutrophils % 89.4 40.0 - 80.0 %    Immature Granulocytes %, Automated 0.7 0.0 - 0.9 %    Lymphocytes % 3.7 13.0 - 44.0 %    Monocytes % 5.8 2.0 - 10.0 %    Eosinophils % 0.1 0.0 - 6.0 %    Basophils % 0.3 0.0 - 2.0 %    Neutrophils Absolute 10.79 (H) 1.20 - 7.70 x10*3/uL    Immature Granulocytes Absolute, Automated 0.09 0.00 - 0.70 x10*3/uL    Lymphocytes Absolute 0.45 (L) 1.20 - 4.80 x10*3/uL    Monocytes Absolute 0.70 0.10 - 1.00 x10*3/uL    Eosinophils Absolute 0.01 0.00 - 0.70 x10*3/uL    Basophils Absolute 0.04 0.00 - 0.10 x10*3/uL   Renal function panel   Result Value Ref Range    Glucose 96 74 - 99 mg/dL    Sodium 129 (L) 136 - 145 mmol/L    Potassium 4.4 3.5 - 5.3 mmol/L    Chloride 95 (L) 98 - 107 mmol/L    Bicarbonate 21 21 - 32 mmol/L    Anion Gap 17 mmol/L    Urea Nitrogen 7 6 - 23 mg/dL    Creatinine 0.68 0.50 - 1.30 mg/dL    eGFR >90 >60 mL/min/1.73m*2    Calcium 8.5 (L) 8.6 - 10.6 mg/dL    Phosphorus 2.1 (L) 2.5 - 4.9 mg/dL    Albumin 3.3 (L) 3.4 - 5.0 g/dL   Urinalysis with Reflex Microscopic   Result Value Ref Range    Color, Urine Angie (N) Straw, Yellow    Appearance, Urine Hazy (N) Clear    Specific Gravity, Urine 1.019 1.005 - 1.035    pH, Urine 6.0 5.0, 5.5, 6.0, 6.5, 7.0, 7.5, 8.0    Protein, Urine 100 (2+) (N) NEGATIVE mg/dL    Glucose, Urine NEGATIVE NEGATIVE mg/dL    Blood, Urine MODERATE (2+) (A) NEGATIVE    Ketones, Urine 20 (1+) (A) NEGATIVE mg/dL    Bilirubin, Urine NEGATIVE NEGATIVE    Urobilinogen, Urine 2.0 (N) <2.0 mg/dL    Nitrite, Urine NEGATIVE NEGATIVE    Leukocyte Esterase, Urine MODERATE (2+) (A) NEGATIVE   Urinalysis Microscopic Only   Result Value Ref Range    WBC, Urine 21-50 (A) 1-5, NONE /HPF    RBC, Urine >20 (A) NONE, 1-2, 3-5 /HPF    Bacteria, Urine 1+ (A) NONE SEEN  /HPF    Mucus, Urine 4+ Reference range not established. /LPF   Sars-CoV-2 PCR, Symptomatic   Result Value Ref Range    Coronavirus 2019, PCR Not Detected Not Detected   Influenza A, and B PCR   Result Value Ref Range    Flu A Result Not Detected Not Detected    Flu B Result Not Detected Not Detected   Blood Culture    Specimen: Peripheral Venipuncture; Blood culture   Result Value Ref Range    Blood Culture Loaded on Instrument - Culture in progress    Blood Culture    Specimen: Peripheral Venipuncture; Blood culture   Result Value Ref Range    Blood Culture Loaded on Instrument - Culture in progress    CBC   Result Value Ref Range    WBC 8.2 4.4 - 11.3 x10*3/uL    nRBC 0.0 0.0 - 0.0 /100 WBCs    RBC 3.50 (L) 4.50 - 5.90 x10*6/uL    Hemoglobin 12.0 (L) 13.5 - 17.5 g/dL    Hematocrit 33.7 (L) 41.0 - 52.0 %    MCV 96 80 - 100 fL    MCH 34.3 (H) 26.0 - 34.0 pg    MCHC 35.6 32.0 - 36.0 g/dL    RDW 11.3 (L) 11.5 - 14.5 %    Platelets 355 150 - 450 x10*3/uL    MPV 9.3 7.5 - 11.5 fL   Basic metabolic panel   Result Value Ref Range    Glucose 97 74 - 99 mg/dL    Sodium 131 (L) 136 - 145 mmol/L    Potassium 3.8 3.5 - 5.3 mmol/L    Chloride 98 98 - 107 mmol/L    Bicarbonate 21 21 - 32 mmol/L    Anion Gap 16 10 - 20 mmol/L    Urea Nitrogen 6 6 - 23 mg/dL    Creatinine 0.53 0.50 - 1.30 mg/dL    eGFR >90 >60 mL/min/1.73m*2    Calcium 7.9 (L) 8.6 - 10.6 mg/dL   Vancomycin   Result Value Ref Range    Vancomycin 5.6 5.0 - 20.0 ug/mL       CT abdomen pelvis w IV contrast    Result Date: 10/1/2023  Interpreted By:  Car Segovia and Meyers Emily STUDY: CT ABDOMEN PELVIS W IV CONTRAST;  10/1/2023 5:01 pm   INDICATION: Signs/Symptoms:s/p cystectomy w/ ileal conduit, c/f intraabdominal infectious processess (note: use of hemostatic agents in bladder bed intraoperatively).   COMPARISON: CT urography 08/30/2023   ACCESSION NUMBER(S): [id number]   ORDERING CLINICIAN: ROSALBA BOTELLO   TECHNIQUE: CT of the abdomen and  pelvis was performed following administration of intravenous contrast. Standard contiguous axial images were obtained at 3 mm slice thickness through the abdomen and pelvis. Coronal and sagittal reconstructions at 3 mm slice thickness were performed.   FINDINGS: LOWER CHEST: Streaky, linear bibasilar opacities, likely representing atelectasis versus scarring. Small calcified granulomas in the bilateral lobes. The heart is normal in size without pericardial effusion. No pleural effusion is present. Visualized distal esophagus appears normal.   ABDOMEN:   LIVER: The liver is normal in size without evidence of focal liver lesions.   BILE DUCTS: The intrahepatic and extrahepatic ducts are not dilated.   GALLBLADDER: The gallbladder is nondistended and without evidence of radiopaque stones.   PANCREAS: The pancreas appears unremarkable without evidence of ductal dilatation or masses.   SPLEEN: The spleen is normal in size without focal lesions.   ADRENAL GLANDS: Bilateral adrenal glands appear normal.   KIDNEYS AND URETERS: The kidneys are normal in size and enhance symmetrically. Stents are noted extending from the anterior abdominal wall through the ileal conduit and terminating within the renal pelvis bilaterally. A small subcentimeter hypodensities noted within the right kidney, which is too small to characterize, however, statistically likely represents a renal cyst. No hydroureteronephrosis or nephroureterolithiasis is identified.   PELVIS:   BLADDER: Interval postsurgical change of cystectomy with formation of an ileal conduit.   REPRODUCTIVE ORGANS: No pelvic masses.   BOWEL: The stomach is unremarkable. Interval postsurgical change of an ileal conduit. There is gradual tapering to several distended fluid-filled loops of small bowel measuring up to 3.6 cm without evidence of discrete transition point.  The appendix appears normal.   VESSELS: There is no aneurysmal dilatation of the abdominal aorta. The IVC  appears normal.   PERITONEUM/RETROPERITONEUM/LYMPH NODES: Free fluid layering adjacent to the liver, spleen, and within the pelvis, likely postsurgical in nature. In addition, there is interval development of a mildly rim enhancing fluid collection within the left hemipelvis measuring up to 4.3 x 3.1 x 3.9 cm (series 201, image 121; series 203, image 73). The fluid collection lies immediately inferior to the bifurcation of the internal and external iliac artery between the external iliac artery and external iliac vein.  No abdominopelvic lymphadenopathy is present.   BONES AND ABDOMINAL WALL: No suspicious osseous lesions are identified.  Postsurgical change along the anterior abdominal wall with increased body wall edema. In addition, there is subcutaneous emphysema extending along the right lateral abdominal wall and anteriorly along the pelvis. Small fat containing umbilical hernia. Stable right groin lipoma measuring up to 3.1 cm.       1.  Interval postsurgical change of cystectomy with ileal conduit formation. 2. Mildly rim enhancing fluid collection within the left hemipelvis measuring up to 4.3 cm immediately inferior left iliac bifurcation between the external iliac artery and vein, favored to represent a lymphocele. However, the sterility of the fluid connection cannot be determined by this exam alone. 3. Gradual tapering of several fluid-filled and distended loops of small bowel measuring up to 3.6 cm, likely suggestive of postoperative ileus. 4. Free fluid layering adjacent to the liver, spleen, and within the pelvis, likely postsurgical in nature. 5. Additional findings as detailed above.   I personally reviewed the images/study, and I agree with the findings as stated above. This study was interpreted at [facility], [employer], [place].   MACRO: None   Signed by: Car Guzman 10/1/2023 5:19 PM Dictation workstation:   [id number]    XR chest 2  views    Result Date: 10/1/2023  Interpreted By:  New Menendez, STUDY: XR CHEST 2 VIEWS   INDICATION: Signs/Symptoms:fever.   COMPARISON: September 15, 2023   ACCESSION NUMBER(S): [id number]   ORDERING CLINICIAN: RILEY HASSAN   FINDINGS: No consolidation, effusion, edema, or pneumothorax.   Small band of atelectasis noted only seen on the lateral view.   Heart size within normal limits.       Small band of mid lung atelectasis. No evidence of other acute intrathoracic abnormality.   Signed by: New Menendez 10/1/2023 2:26 PM Dictation workstation:   [id number]    XR abdomen AP view    Result Date: 9/21/2023  Interpreted By:  BRIAN TROTTER MD and JIMMY CORLEY MD MRN: [id number] Patient Name: DEEPIKA SANTOS  STUDY: ABDOMEN; 1 VIEW;  9/20/2023 5:14 pm  INDICATION: assess bilateral stent placement .  COMPARISON: CT abdomen pelvis 08/30/2023  ACCESSION NUMBER(S): [id number]  ORDERING CLINICIAN: SCOOBY LOWE  FINDINGS: AP radiograph of the abdomen was provided.  Bilateral ureteral stents are in place with proximal portions overlying the expected regions of the renal pelvises. A surgical drain overlies the pelvis.  Nonobstructive bowel gas pattern. Limited evaluation of pneumoperitoneum on supine imaging, however no gross evidence of free air is noted.  Osseous structures demonstrate no acute bony changes.      1.  Bilateral ureteral stents in place with proximal portions overlying the expected regions of the renal pelvises. 2. Nonobstructive bowel gas pattern.  I personally reviewed the images/study and I agree with the findings as stated. This study was interpreted at [place], [place].  MACRO: None    XR chest 2 view    Result Date: 9/15/2023  Interpreted By:  BRY MCDONALD MD and DEENA GILLIAM MD MRN: [id number] Patient Name: DEEPIKA SANTOS  STUDY: TH CHEST 2 VIEW PA AND LAT; ;  9/15/2023 10:47 am  INDICATION: tobacco use  COMPARISON: CT chest on 08/30/2023   ACCESSION NUMBER(S): [id number]  ORDERING CLINICIAN: MARTY BOLAÑOS  TECHNIQUE: PA and lateral chest radiographs are submitted for review. Additional PA dual energy images were also provided.  FINDINGS: LINES/TUBES/DEVICES: None  LUNGS: Lungs are clear. No focal consolidation, pleural effusion, or pneumothorax.  CARDIOMEDIASTINAL SILHOUETTE: Cardiomediastinal silhouette is normal in size and configuration. Calcification of the aortic knob.  ABDOMEN: No remarkable upper abdominal findings.  BONES: No acute osseous changes.      No radiographic evidence of acute cardiopulmonary process.   I personally reviewed the images/study and I agree with radiology resident Dr. Francesca Lewis's findings as stated. This study was interpreted at [place], [place]         Assessment/Plan   Rolando Eason is a 57 y.o. male POD10 following robotic cystectomy w/ IC creation w/ Dr. Bolaños on 9/20 presenting with recorded fever of 38.3 and increased hematuria. Patient was originally discharged home on POD4 with no concerns and is scheduled to follow up with Dr. Bolaños on 10/4. CT shows left sided pelvic lymphocele. Patient started on IVF and IV abx, will fu cultures.    Neuro:   - Scheduled Tylenol    Cardiac:  - Cont home losartan, metoprolol, amlodipine  - Maintain MAP >65  - Continue to monitor BP                                                                                                                                                                               Pulm:   - Encourage IS  - Maintain O2 >88%    GI:  - Reg diet  - Bowel regimen  - PRN Zofran for nausea    :   - IVF 100ml/hr LR  - Stoma stents in place  - Daily BMP  - Strict I&Os  - Replete lytes PRN    HEME:  - Trend CBC daily   - Transfusion not indicated at this time    Endo:   - No acute or chronic    ID:  - Fever to 38.8 overnight  - FU Ucx and Bcx  - Cont Zosyn (start date 10/1)  - Cont Vancomycin (start date  10/1)  - Monitor for s/sx of infection     DVT Ppx:  - SCDs  - Lovenox 40mg    Dispo: RNF    Discussed with attending, Dr. Garfield William MD

## 2023-10-02 NOTE — ED NOTES
Pharmacy Medication History Review    Rolando Eason is a 57 y.o. male admitted for Postsurgical fever. Pharmacy reviewed the patient's hgazr-[id number] medications and allergies for accuracy.    The list below reflectives the updated PTA list. Please review each medication in order reconciliation for additional clarification and justification.  Medications Prior to Admission   Medication Sig Dispense Refill Last Dose    acetaminophen (Tylenol) 325 mg tablet Take 1 tablet (325 mg) by mouth every 6 hours if needed for fever (temp greater than 38.0 C).   Past Week    amLODIPine (Norvasc) 10 mg tablet Take 1 tablet (10 mg) by mouth once daily.   10/1/2023 at 1800    betamethasone dipropionate (Diprolene) 0.05 % ointment Apply 1 Application topically once daily as needed for rash.   Past Month    enoxaparin (Lovenox) 40 mg/0.4 mL syringe Inject 0.4 mL (40 mg) under the skin once daily.   10/1/2023    losartan (Cozaar) 100 mg tablet Take 1 tablet (100 mg) by mouth once daily.   10/1/2023 at 1800    metoprolol succinate XL (Toprol-XL) 50 mg 24 hr tablet Take 1 tablet (50 mg) by mouth once daily. Do not crush or chew.   10/1/2023 at 1800        The list below reflectives the updated allergy list. Please review each documented allergy for additional clarification and justification.  Allergies  Reviewed by Nancy Negrete PharmD on 10/2/2023        Severity Reactions Comments    Lisinopril Not Specified Cough           Sources: Patient interview, Lidia, [facility] office visit 8/14/23, [facility] admission 9/20/23    Below are additional concerns with the patient's PTA list.  Patient started enoxaparin 40 mg once daily after surgery on 9/25/23  Patient previously using Miralax at home, but states he has been having loose stools the past ~2 days so will likely not continue it    Nancy Rodriguez PharmD

## 2023-10-02 NOTE — CARE PLAN
The patient's goals for the shift include  to not fall by the end of the shift    The clinical goals for the shift include to closely monitor the fevers    Over the shift, the patient did not make progress toward the following goals. Barriers to progression include utilizing call light.

## 2023-10-02 NOTE — CARE PLAN
The patient's goals for the shift include  not falling by the end of the shift.    The clinical goals for the shift include to closely monitor the fevers    Over the shift, the patient did not make progress toward the following goals.

## 2023-10-02 NOTE — PROGRESS NOTES
Vancomycin Dosing by Pharmacy- FOLLOW UP    Rolando Eason is a 57 y.o. year old male who Pharmacy has been consulted for vancomycin dosing for other sepsis/urinary tract infection per notes . Based on the patient's indication and renal status this patient is being dosed based on a goal AUC of 500-600.     Renal function is currently stable.    Current vancomycin dose: 1250 mg given every 12 hours    Estimated vancomycin AUC on current dose: 433 mg/L.hr     Visit Vitals  /85 (BP Location: Right arm, Patient Position: Lying)   Pulse 102   Temp 36.1 °C (97 °F) (Temporal)   Resp 18      I/O last 3 completed shifts:  In: 946.7 (10.4 mL/kg) [I.V.:946.7 (10.4 mL/kg)]  Out: 200 (2.2 mL/kg) [Urine:200 (0.1 mL/kg/hr)]  Weight: 90.7 kg     Lab Results   Component Value Date    PATIENTTEMP 37.0 09/20/2023      Assessment/Plan    Below goal AUC. Orders placed for new vancomcyin regimen of 1500 every 12 hours to begin at 2100.   This dosing regimen is predicted by [employer] to result in the following pharmacokinetic parameters:  AUC24,ss: 519 mg/L.hr  Probability of AUC24 > 400: 85 %  Ctrough,ss: 15.3 mg/L  Probability of Ctrough,ss > 20: 24 %  Probability of nephrotoxicity (Lodise JED 2009): 11 %    The next level will be obtained on 10/4 with AM labs. May be obtained sooner if clinically indicated.   Will continue to monitor renal function daily while on vancomycin and order serum creatinine at least every 48 hours if not already ordered.  Follow for continued vancomycin needs, clinical response, and signs/symptoms of toxicity.     Joann Brito, PharmD

## 2023-10-02 NOTE — PROGRESS NOTES
"Vancomycin Dosing by Pharmacy- INITIAL    Rolando Eason is a 57 y.o. year old male who Pharmacy has been consulted for vancomycin dosing for other sepsis, follow up with day team to clarify indication (not part of initial consult) . Based on the patient's indication and renal status this patient will be dosed based on a goal AUC of 400-600.     Renal function is currently stable.    Visit Vitals  /73 (BP Location: Right arm, Patient Position: Lying)   Pulse 104   Temp 38.8 °C (101.8 °F) (Temporal)   Resp 18        Lab Results   Component Value Date    CREATININE 0.68 10/01/2023    CREATININE 0.66 09/25/2023    CREATININE 0.51 09/24/2023    CREATININE 0.58 09/23/2023    CREATININE 0.67 09/22/2023        Patient weight is No results found for: \"PTWEIGHT\"    No results found for: \"CULTURE\"     No intake/output data recorded.  [unfilled]    Lab Results   Component Value Date    PATIENTTEMP 37.0 09/20/2023          Assessment/Plan     Patient has already been given a loading dose of 1500 mg.  Will initiate vancomycin maintenance,  1250 mg every 12 hours.    This dosing regimen is predicted by [employer] to result in the follow  Loading dose: N/A  Regimen: 1250 mg IV every 12 hours.  Start time: 06:13 on 10/02/2023  Exposure target: AUC24 (range)400-600 mg/L.hr   AUC24,ss: 465 mg/L.hr  Probability of AUC24 > 400: 66 %  Ctrough,ss: 13.8 mg/L  Probability of Ctrough,ss > 20: 23 %  Probability of nephrotoxicity (Lodise JED 2009): 9 %    Follow-up level will be ordered on 10/2 am unless clinically indicated sooner.  Will continue to monitor renal function daily while on vancomycin and order serum creatinine at least every 48 hours if not already ordered.  Follow for continued vancomycin needs, clinical response, and signs/symptoms of toxicity.       Adriana Wade, PharmD       "

## 2023-10-03 LAB
ALBUMIN SERPL BCP-MCNC: 2.9 G/DL (ref 3.4–5)
ANION GAP SERPL CALC-SCNC: 15 MMOL/L (ref 10–20)
BUN SERPL-MCNC: 6 MG/DL (ref 6–23)
CALCIUM SERPL-MCNC: 8.1 MG/DL (ref 8.6–10.6)
CHLORIDE SERPL-SCNC: 94 MMOL/L (ref 98–107)
CO2 SERPL-SCNC: 24 MMOL/L (ref 21–32)
CREAT SERPL-MCNC: 0.49 MG/DL (ref 0.5–1.3)
ERYTHROCYTE [DISTWIDTH] IN BLOOD BY AUTOMATED COUNT: 11.5 % (ref 11.5–14.5)
GFR SERPL CREATININE-BSD FRML MDRD: >90 ML/MIN/1.73M*2
GLUCOSE SERPL-MCNC: 104 MG/DL (ref 74–99)
HCT VFR BLD AUTO: 36 % (ref 41–52)
HGB BLD-MCNC: 12.2 G/DL (ref 13.5–17.5)
MCH RBC QN AUTO: 33.9 PG (ref 26–34)
MCHC RBC AUTO-ENTMCNC: 33.9 G/DL (ref 32–36)
MCV RBC AUTO: 100 FL (ref 80–100)
NRBC BLD-RTO: 0 /100 WBCS (ref 0–0)
PHOSPHATE SERPL-MCNC: 2.6 MG/DL (ref 2.5–4.9)
PLATELET # BLD AUTO: 428 X10*3/UL (ref 150–450)
PMV BLD AUTO: 8.8 FL (ref 7.5–11.5)
POTASSIUM SERPL-SCNC: 3.8 MMOL/L (ref 3.5–5.3)
RBC # BLD AUTO: 3.6 X10*6/UL (ref 4.5–5.9)
SODIUM SERPL-SCNC: 129 MMOL/L (ref 136–145)
VANCOMYCIN SERPL-MCNC: 2.8 UG/ML (ref 5–20)
WBC # BLD AUTO: 8.4 X10*3/UL (ref 4.4–11.3)

## 2023-10-03 PROCEDURE — 85027 COMPLETE CBC AUTOMATED: CPT

## 2023-10-03 PROCEDURE — 80069 RENAL FUNCTION PANEL: CPT

## 2023-10-03 PROCEDURE — 2500000004 HC RX 250 GENERAL PHARMACY W/ HCPCS (ALT 636 FOR OP/ED)

## 2023-10-03 PROCEDURE — 96372 THER/PROPH/DIAG INJ SC/IM: CPT | Performed by: STUDENT IN AN ORGANIZED HEALTH CARE EDUCATION/TRAINING PROGRAM

## 2023-10-03 PROCEDURE — 36415 COLL VENOUS BLD VENIPUNCTURE: CPT

## 2023-10-03 PROCEDURE — 2500000004 HC RX 250 GENERAL PHARMACY W/ HCPCS (ALT 636 FOR OP/ED): Performed by: STUDENT IN AN ORGANIZED HEALTH CARE EDUCATION/TRAINING PROGRAM

## 2023-10-03 PROCEDURE — 2500000001 HC RX 250 WO HCPCS SELF ADMINISTERED DRUGS (ALT 637 FOR MEDICARE OP)

## 2023-10-03 PROCEDURE — 87040 BLOOD CULTURE FOR BACTERIA: CPT

## 2023-10-03 PROCEDURE — 80202 ASSAY OF VANCOMYCIN: CPT

## 2023-10-03 PROCEDURE — 1170000001 HC PRIVATE ONCOLOGY ROOM DAILY

## 2023-10-03 RX ADMIN — PIPERACILLIN SODIUM AND TAZOBACTAM SODIUM 3.38 G: 3; .375 INJECTION, SOLUTION INTRAVENOUS at 18:45

## 2023-10-03 RX ADMIN — LOSARTAN POTASSIUM 100 MG: 50 TABLET, FILM COATED ORAL at 18:04

## 2023-10-03 RX ADMIN — ENOXAPARIN SODIUM 40 MG: 40 INJECTION SUBCUTANEOUS at 04:57

## 2023-10-03 RX ADMIN — PIPERACILLIN SODIUM AND TAZOBACTAM SODIUM 3.38 G: 3; .375 INJECTION, SOLUTION INTRAVENOUS at 11:55

## 2023-10-03 RX ADMIN — PIPERACILLIN SODIUM AND TAZOBACTAM SODIUM 3.38 G: 3; .375 INJECTION, SOLUTION INTRAVENOUS at 04:56

## 2023-10-03 RX ADMIN — AMLODIPINE BESYLATE 10 MG: 10 TABLET ORAL at 18:04

## 2023-10-03 RX ADMIN — ENOXAPARIN SODIUM 40 MG: 40 INJECTION SUBCUTANEOUS at 17:03

## 2023-10-03 RX ADMIN — METOPROLOL SUCCINATE 50 MG: 50 TABLET, EXTENDED RELEASE ORAL at 18:04

## 2023-10-03 RX ADMIN — VANCOMYCIN HYDROCHLORIDE 1500 MG: 1.5 INJECTION, POWDER, LYOPHILIZED, FOR SOLUTION INTRAVENOUS at 09:58

## 2023-10-03 ASSESSMENT — PAIN SCALES - GENERAL
PAINLEVEL_OUTOF10: 2
PAINLEVEL_OUTOF10: 2
PAINLEVEL_OUTOF10: 0 - NO PAIN

## 2023-10-03 ASSESSMENT — COGNITIVE AND FUNCTIONAL STATUS - GENERAL
DAILY ACTIVITIY SCORE: 24
MOBILITY SCORE: 24

## 2023-10-03 ASSESSMENT — PAIN - FUNCTIONAL ASSESSMENT
PAIN_FUNCTIONAL_ASSESSMENT: 0-10

## 2023-10-03 NOTE — PROGRESS NOTES
Vancomycin Dosing by Pharmacy- FOLLOW UP    Rolando Eason is a 57 y.o. year old male who Pharmacy has been consulted for vancomycin dosing for other urinary tract infection/sepsis . Based on the patient's indication and renal status this patient is being dosed based on a goal AUC of 400-600.     Renal function is currently stable.    Current vancomycin dose: 1500 mg given every 12 hours    Estimated vancomycin AUC on current dose: 367 mg/L.hr     Visit Vitals  /72   Pulse 92   Temp 36.8 °C (98.2 °F)   Resp 18        Lab Results   Component Value Date    CREATININE 0.49 (L) 10/03/2023    CREATININE 0.53 10/02/2023    CREATININE 0.68 10/01/2023    CREATININE 0.66 09/25/2023    CREATININE 0.51 09/24/2023    CREATININE 0.58 09/23/2023    CREATININE 0.67 09/22/2023       I/O last 3 completed shifts:  In: 3946.7 (43.5 mL/kg) [I.V.:2946.7 (32.5 mL/kg); IV Piggyback:1000]  Out: 2275 (25.1 mL/kg) [Urine:2275 (0.7 mL/kg/hr)]  Weight: 90.7 kg     Lab Results   Component Value Date    PATIENTTEMP 37.0 09/20/2023        Assessment/Plan    Below goal AUC. Orders placed for new vancomcyin regimen of 2000 every 12 hours to begin at 2200.     This dosing regimen is predicted by [employer] to result in the following pharmacokinetic parameters:  AUC24,ss: 489 mg/L.hr  Probability of AUC24 > 400: 85 %  Ctrough,ss: 10 mg/L  Probability of Ctrough,ss > 20: 1 %  Probability of nephrotoxicity (Lodise JED 2009): 6 %    The next level will be obtained on 10/4 with AM labs. May be obtained sooner if clinically indicated.   Will continue to monitor renal function daily while on vancomycin and order serum creatinine at least every 48 hours if not already ordered.  Follow for continued vancomycin needs, clinical response, and signs/symptoms of toxicity.     Joann Brito, PharmD

## 2023-10-03 NOTE — CONSULTS
Vancomycin Dosing by Pharmacy- Cessation of Therapy    Consult to pharmacy for vancomycin dosing has been discontinued by the prescriber, pharmacy will sign off at this time.    Please call pharmacy if there are further questions or re-enter a consult if vancomycin is resumed.     Joann Brito, PharmD

## 2023-10-03 NOTE — CARE PLAN
The patient's goals for the shift include      The clinical goals for the shift include met    Over the shift, the patient did not make progress toward the following goals. Barriers to progression include. Recommendations to address these barriers include.

## 2023-10-03 NOTE — PROGRESS NOTES
"Rolando Eason is a 57 y.o. male on day 2 of admission presenting with Postsurgical fever.    Subjective   ***       Objective     Physical Exam    Last Recorded Vitals  Blood pressure 127/66, pulse 88, temperature 37.5 °C (99.5 °F), temperature source Temporal, resp. rate 18, height 1.753 m (5' 9.02\"), weight 90.7 kg (199 lb 15.3 oz), SpO2 98 %.  Intake/Output last 3 Shifts:  I/O last 3 completed shifts:  In: 3946.7 (43.5 mL/kg) [I.V.:2946.7 (32.5 mL/kg); IV Piggyback:1000]  Out: 2275 (25.1 mL/kg) [Urine:2275 (0.7 mL/kg/hr)]  Weight: 90.7 kg     Relevant Results  {If you would like to pull in Medications, type .meds     If you would like to pull in Lab results for the last 24 hours, type .[id number]    If you would like to pull in Imaging results, type .[id number] :99}    {Link to Stroke Scoring tools - Link :99}                       Assessment/Plan   Principal Problem:    Postsurgical fever  Active Problems:    Lymphocele after surgical procedure    ***     {This patient does not have an ACP note on file for this encounter, please fill one out - Advance Care Planning Activity :99}      America Sierra RN      "

## 2023-10-03 NOTE — PROGRESS NOTES
"Rolando Eason is a 57 y.o. male on day 2 of admission presenting with Postsurgical fever.    Subjective   Reports feeling much better today, less fatigued. Bcx growing gram negative bacilli x 2.        Objective     General: Laying in bed. NAD.   Eyes: EOMI  ENMT: no apparent injury, no lesions seen, MMM  Head/neck: NCAT  Cardiac: regular rate in chart  Pulm: normal respiratory effort  GI: soft, NT, moderately distended, ostomy viable with stents in place, lap incisions stapled  : marta colored urine  Msk: [place]O  Extremities: normal extremities  Skin: warm, dry, no lesions noted  Neuro: AOx3  Psych: appropriate mood and behavior     Last Recorded Vitals  Blood pressure 115/72, pulse 92, temperature 36.8 °C (98.2 °F), resp. rate 18, height 1.753 m (5' 9.02\"), weight 90.7 kg (199 lb 15.3 oz), SpO2 95 %.  Intake/Output last 3 Shifts:  I/O last 3 completed shifts:  In: 3946.7 (43.5 mL/kg) [I.V.:2946.7 (32.5 mL/kg); IV Piggyback:1000]  Out: 2275 (25.1 mL/kg) [Urine:2275 (0.7 mL/kg/hr)]  Weight: 90.7 kg     Relevant Results  Scheduled medications  amLODIPine, 10 mg, oral, Daily  docusate sodium, 100 mg, oral, BID  enoxaparin, 40 mg, subcutaneous, q12h  losartan, 100 mg, oral, Daily  metoprolol succinate XL, 50 mg, oral, Daily  piperacillin-tazobactam, 3.375 g, intravenous, q6h  polyethylene glycol, 17 g, oral, Daily  vancomycin, 1,500 mg, intravenous, q12h      Continuous medications       PRN medications  PRN medications: acetaminophen, acetaminophen    Results for orders placed or performed during the hospital encounter of 10/01/23 (from the past 24 hour(s))   Vancomycin   Result Value Ref Range    Vancomycin 2.8 (L) 5.0 - 20.0 ug/mL   CBC   Result Value Ref Range    WBC 8.4 4.4 - 11.3 x10*3/uL    nRBC 0.0 0.0 - 0.0 /100 WBCs    RBC 3.60 (L) 4.50 - 5.90 x10*6/uL    Hemoglobin 12.2 (L) 13.5 - 17.5 g/dL    Hematocrit 36.0 (L) 41.0 - 52.0 %     80 - 100 fL    MCH 33.9 26.0 - 34.0 pg    MCHC 33.9 32.0 - 36.0 g/dL "    RDW 11.5 11.5 - 14.5 %    Platelets 428 150 - 450 x10*3/uL    MPV 8.8 7.5 - 11.5 fL   Renal function panel   Result Value Ref Range    Glucose 104 (H) 74 - 99 mg/dL    Sodium 129 (L) 136 - 145 mmol/L    Potassium 3.8 3.5 - 5.3 mmol/L    Chloride 94 (L) 98 - 107 mmol/L    Bicarbonate 24 21 - 32 mmol/L    Anion Gap 15 10 - 20 mmol/L    Urea Nitrogen 6 6 - 23 mg/dL    Creatinine 0.49 (L) 0.50 - 1.30 mg/dL    eGFR >90 >60 mL/min/1.73m*2    Calcium 8.1 (L) 8.6 - 10.6 mg/dL    Phosphorus 2.6 2.5 - 4.9 mg/dL    Albumin 2.9 (L) 3.4 - 5.0 g/dL       CT abdomen pelvis w IV contrast    Result Date: 10/1/2023  Interpreted By:  Car Segovia and Meyers Emily STUDY: CT ABDOMEN PELVIS W IV CONTRAST;  10/1/2023 5:01 pm   INDICATION: Signs/Symptoms:s/p cystectomy w/ ileal conduit, c/f intraabdominal infectious processess (note: use of hemostatic agents in bladder bed intraoperatively).   COMPARISON: CT urography 08/30/2023   ACCESSION NUMBER(S): [id number]   ORDERING CLINICIAN: ROSALBA BOTELLO   TECHNIQUE: CT of the abdomen and pelvis was performed following administration of intravenous contrast. Standard contiguous axial images were obtained at 3 mm slice thickness through the abdomen and pelvis. Coronal and sagittal reconstructions at 3 mm slice thickness were performed.   FINDINGS: LOWER CHEST: Streaky, linear bibasilar opacities, likely representing atelectasis versus scarring. Small calcified granulomas in the bilateral lobes. The heart is normal in size without pericardial effusion. No pleural effusion is present. Visualized distal esophagus appears normal.   ABDOMEN:   LIVER: The liver is normal in size without evidence of focal liver lesions.   BILE DUCTS: The intrahepatic and extrahepatic ducts are not dilated.   GALLBLADDER: The gallbladder is nondistended and without evidence of radiopaque stones.   PANCREAS: The pancreas appears unremarkable without evidence of ductal dilatation or masses.    SPLEEN: The spleen is normal in size without focal lesions.   ADRENAL GLANDS: Bilateral adrenal glands appear normal.   KIDNEYS AND URETERS: The kidneys are normal in size and enhance symmetrically. Stents are noted extending from the anterior abdominal wall through the ileal conduit and terminating within the renal pelvis bilaterally. A small subcentimeter hypodensities noted within the right kidney, which is too small to characterize, however, statistically likely represents a renal cyst. No hydroureteronephrosis or nephroureterolithiasis is identified.   PELVIS:   BLADDER: Interval postsurgical change of cystectomy with formation of an ileal conduit.   REPRODUCTIVE ORGANS: No pelvic masses.   BOWEL: The stomach is unremarkable. Interval postsurgical change of an ileal conduit. There is gradual tapering to several distended fluid-filled loops of small bowel measuring up to 3.6 cm without evidence of discrete transition point.  The appendix appears normal.   VESSELS: There is no aneurysmal dilatation of the abdominal aorta. The IVC appears normal.   PERITONEUM/RETROPERITONEUM/LYMPH NODES: Free fluid layering adjacent to the liver, spleen, and within the pelvis, likely postsurgical in nature. In addition, there is interval development of a mildly rim enhancing fluid collection within the left hemipelvis measuring up to 4.3 x 3.1 x 3.9 cm (series 201, image 121; series 203, image 73). The fluid collection lies immediately inferior to the bifurcation of the internal and external iliac artery between the external iliac artery and external iliac vein.  No abdominopelvic lymphadenopathy is present.   BONES AND ABDOMINAL WALL: No suspicious osseous lesions are identified.  Postsurgical change along the anterior abdominal wall with increased body wall edema. In addition, there is subcutaneous emphysema extending along the right lateral abdominal wall and anteriorly along the pelvis. Small fat containing umbilical hernia.  Stable right groin lipoma measuring up to 3.1 cm.       1.  Interval postsurgical change of cystectomy with ileal conduit formation. 2. Mildly rim enhancing fluid collection within the left hemipelvis measuring up to 4.3 cm immediately inferior left iliac bifurcation between the external iliac artery and vein, favored to represent a lymphocele. However, the sterility of the fluid connection cannot be determined by this exam alone. 3. Gradual tapering of several fluid-filled and distended loops of small bowel measuring up to 3.6 cm, likely suggestive of postoperative ileus. 4. Free fluid layering adjacent to the liver, spleen, and within the pelvis, likely postsurgical in nature. 5. Additional findings as detailed above.   I personally reviewed the images/study, and I agree with the findings as stated above. This study was interpreted at [place], [place].   MACRO: None   Signed by: Car Guzman 10/1/2023 5:19 PM Dictation workstation:   [id number]    XR chest 2 views    Result Date: 10/1/2023  Interpreted By:  New Menendez, STUDY: XR CHEST 2 VIEWS   INDICATION: Signs/Symptoms:fever.   COMPARISON: September 15, 2023   ACCESSION NUMBER(S): [id number]   ORDERING CLINICIAN: RILEY HASSAN   FINDINGS: No consolidation, effusion, edema, or pneumothorax.   Small band of atelectasis noted only seen on the lateral view.   Heart size within normal limits.       Small band of mid lung atelectasis. No evidence of other acute intrathoracic abnormality.   Signed by: New Menendez 10/1/2023 2:26 PM Dictation workstation:   [id number]    XR abdomen AP view    Result Date: 9/21/2023  Interpreted By:  BRIAN TROTTER MD and JIMMY CORLEY MD MRN: [id number] Patient Name: DEEPIKA SANTOS  STUDY: ABDOMEN; 1 VIEW;  9/20/2023 5:14 pm  INDICATION: assess bilateral stent placement .  COMPARISON: CT abdomen pelvis 08/30/2023  ACCESSION NUMBER(S): [id number]  ORDERING CLINICIAN:  SCOOBY LOWE  FINDINGS: AP radiograph of the abdomen was provided.  Bilateral ureteral stents are in place with proximal portions overlying the expected regions of the renal pelvises. A surgical drain overlies the pelvis.  Nonobstructive bowel gas pattern. Limited evaluation of pneumoperitoneum on supine imaging, however no gross evidence of free air is noted.  Osseous structures demonstrate no acute bony changes.      1.  Bilateral ureteral stents in place with proximal portions overlying the expected regions of the renal pelvises. 2. Nonobstructive bowel gas pattern.  I personally reviewed the images/study and I agree with the findings as stated. This study was interpreted at [place], [place].  MACRO: None    XR chest 2 view    Result Date: 9/15/2023  Interpreted By:  BRY MCDONALD MD and DEENA GILLIAM MD MRN: [id number] Patient Name: DEEPIKA EASON  STUDY: TH CHEST 2 VIEW PA AND LAT; ;  9/15/2023 10:47 am  INDICATION: tobacco use  COMPARISON: CT chest on 08/30/2023  ACCESSION NUMBER(S): [id number]  ORDERING CLINICIAN: MARTY BOLAÑOS  TECHNIQUE: PA and lateral chest radiographs are submitted for review. Additional PA dual energy images were also provided.  FINDINGS: LINES/TUBES/DEVICES: None  LUNGS: Lungs are clear. No focal consolidation, pleural effusion, or pneumothorax.  CARDIOMEDIASTINAL SILHOUETTE: Cardiomediastinal silhouette is normal in size and configuration. Calcification of the aortic knob.  ABDOMEN: No remarkable upper abdominal findings.  BONES: No acute osseous changes.      No radiographic evidence of acute cardiopulmonary process.   I personally reviewed the images/study and I agree with radiology resident Dr. Francesca Lewis's findings as stated. This study was interpreted at [place], [place]         Assessment/Plan   Deepika Eason is a 57 y.o. male POD10 following robotic cystectomy w/ IC creation w/  Dr. Merrill on 9/20 presenting with recorded fever of 38.3 and increased hematuria. Patient was originally discharged home on POD4 with no concerns and is scheduled to follow up with Dr. Merrill on 10/4. CT shows left sided pelvic lymphocele. Patient started on IVF and IV abx, will fu cultures.    10/3: Blood cultures growing GNB x 2.     Neuro:   - Scheduled Tylenol    Cardiac:  - Cont home losartan, metoprolol, amlodipine  - Maintain MAP >65  - Continue to monitor BP                                                                                                                                                                               Pulm:   - Encourage IS  - Maintain O2 >88%    GI:  - Reg diet  - Bowel regimen  - PRN Zofran for nausea    :   - HLIV  - Stoma stents in place  - Daily BMP  - Strict I&Os  - Replete lytes PRN    HEME:  - Trend CBC daily   - Transfusion not indicated at this time    Endo:   - No acute or chronic    ID:  - Afebrile overnight  - Blood cultures growing GNB X 2  - FU Ucx   - Cont Zosyn (start date 10/1)  - Cont Vancomycin (start date 10/1)  - Monitor for s/sx of infection  - Repeat BCx     DVT Ppx:  - SCDs  - Lovenox 40mg    Dispo: RNF    Discussed with attending, Dr. Garfield Auguste MD

## 2023-10-04 ENCOUNTER — APPOINTMENT (OUTPATIENT)
Dept: UROLOGY | Facility: CLINIC | Age: 58
End: 2023-10-04
Payer: COMMERCIAL

## 2023-10-04 LAB
ALBUMIN SERPL BCP-MCNC: 3 G/DL (ref 3.4–5)
ANION GAP SERPL CALC-SCNC: 15 MMOL/L (ref 10–20)
BACTERIA UR CULT: ABNORMAL
BUN SERPL-MCNC: 5 MG/DL (ref 6–23)
CALCIUM SERPL-MCNC: 8.6 MG/DL (ref 8.6–10.6)
CHLORIDE SERPL-SCNC: 97 MMOL/L (ref 98–107)
CO2 SERPL-SCNC: 22 MMOL/L (ref 21–32)
CREAT SERPL-MCNC: 0.48 MG/DL (ref 0.5–1.3)
ERYTHROCYTE [DISTWIDTH] IN BLOOD BY AUTOMATED COUNT: 11.3 % (ref 11.5–14.5)
GFR SERPL CREATININE-BSD FRML MDRD: >90 ML/MIN/1.73M*2
GLUCOSE SERPL-MCNC: 134 MG/DL (ref 74–99)
HCT VFR BLD AUTO: 33.4 % (ref 41–52)
HGB BLD-MCNC: 11.9 G/DL (ref 13.5–17.5)
MCH RBC QN AUTO: 34.6 PG (ref 26–34)
MCHC RBC AUTO-ENTMCNC: 35.6 G/DL (ref 32–36)
MCV RBC AUTO: 97 FL (ref 80–100)
NRBC BLD-RTO: 0 /100 WBCS (ref 0–0)
PHOSPHATE SERPL-MCNC: 2.4 MG/DL (ref 2.5–4.9)
PLATELET # BLD AUTO: 459 X10*3/UL (ref 150–450)
PMV BLD AUTO: 9 FL (ref 7.5–11.5)
POTASSIUM SERPL-SCNC: 3.4 MMOL/L (ref 3.5–5.3)
RBC # BLD AUTO: 3.44 X10*6/UL (ref 4.5–5.9)
SODIUM SERPL-SCNC: 131 MMOL/L (ref 136–145)
VANCOMYCIN SERPL-MCNC: 3.3 UG/ML (ref 5–20)
WBC # BLD AUTO: 8 X10*3/UL (ref 4.4–11.3)

## 2023-10-04 PROCEDURE — 96372 THER/PROPH/DIAG INJ SC/IM: CPT | Performed by: STUDENT IN AN ORGANIZED HEALTH CARE EDUCATION/TRAINING PROGRAM

## 2023-10-04 PROCEDURE — 36415 COLL VENOUS BLD VENIPUNCTURE: CPT | Performed by: STUDENT IN AN ORGANIZED HEALTH CARE EDUCATION/TRAINING PROGRAM

## 2023-10-04 PROCEDURE — 2500000001 HC RX 250 WO HCPCS SELF ADMINISTERED DRUGS (ALT 637 FOR MEDICARE OP)

## 2023-10-04 PROCEDURE — 80202 ASSAY OF VANCOMYCIN: CPT

## 2023-10-04 PROCEDURE — 2500000001 HC RX 250 WO HCPCS SELF ADMINISTERED DRUGS (ALT 637 FOR MEDICARE OP): Performed by: NURSE PRACTITIONER

## 2023-10-04 PROCEDURE — 2500000001 HC RX 250 WO HCPCS SELF ADMINISTERED DRUGS (ALT 637 FOR MEDICARE OP): Performed by: STUDENT IN AN ORGANIZED HEALTH CARE EDUCATION/TRAINING PROGRAM

## 2023-10-04 PROCEDURE — 2500000004 HC RX 250 GENERAL PHARMACY W/ HCPCS (ALT 636 FOR OP/ED): Performed by: STUDENT IN AN ORGANIZED HEALTH CARE EDUCATION/TRAINING PROGRAM

## 2023-10-04 PROCEDURE — 1170000001 HC PRIVATE ONCOLOGY ROOM DAILY

## 2023-10-04 PROCEDURE — 80069 RENAL FUNCTION PANEL: CPT | Performed by: STUDENT IN AN ORGANIZED HEALTH CARE EDUCATION/TRAINING PROGRAM

## 2023-10-04 PROCEDURE — 85027 COMPLETE CBC AUTOMATED: CPT | Performed by: STUDENT IN AN ORGANIZED HEALTH CARE EDUCATION/TRAINING PROGRAM

## 2023-10-04 RX ADMIN — AMLODIPINE BESYLATE 10 MG: 10 TABLET ORAL at 17:43

## 2023-10-04 RX ADMIN — PIPERACILLIN SODIUM AND TAZOBACTAM SODIUM 3.38 G: 3; .375 INJECTION, SOLUTION INTRAVENOUS at 12:13

## 2023-10-04 RX ADMIN — POTASSIUM PHOSPHATE, MONOBASIC 500 MG: 500 TABLET, SOLUBLE ORAL at 15:07

## 2023-10-04 RX ADMIN — POTASSIUM PHOSPHATE, MONOBASIC 500 MG: 500 TABLET, SOLUBLE ORAL at 18:25

## 2023-10-04 RX ADMIN — POLYETHYLENE GLYCOL 3350 17 G: 17 POWDER, FOR SOLUTION ORAL at 08:35

## 2023-10-04 RX ADMIN — LOSARTAN POTASSIUM 100 MG: 50 TABLET, FILM COATED ORAL at 17:43

## 2023-10-04 RX ADMIN — METOPROLOL SUCCINATE 50 MG: 50 TABLET, EXTENDED RELEASE ORAL at 17:43

## 2023-10-04 RX ADMIN — PIPERACILLIN SODIUM AND TAZOBACTAM SODIUM 3.38 G: 3; .375 INJECTION, SOLUTION INTRAVENOUS at 00:22

## 2023-10-04 RX ADMIN — ACETAMINOPHEN 650 MG: 325 TABLET, FILM COATED ORAL at 12:49

## 2023-10-04 RX ADMIN — ENOXAPARIN SODIUM 40 MG: 40 INJECTION SUBCUTANEOUS at 17:44

## 2023-10-04 RX ADMIN — PIPERACILLIN SODIUM AND TAZOBACTAM SODIUM 3.38 G: 3; .375 INJECTION, SOLUTION INTRAVENOUS at 22:00

## 2023-10-04 RX ADMIN — PIPERACILLIN SODIUM AND TAZOBACTAM SODIUM 3.38 G: 3; .375 INJECTION, SOLUTION INTRAVENOUS at 06:11

## 2023-10-04 RX ADMIN — ENOXAPARIN SODIUM 40 MG: 40 INJECTION SUBCUTANEOUS at 06:11

## 2023-10-04 ASSESSMENT — PAIN - FUNCTIONAL ASSESSMENT
PAIN_FUNCTIONAL_ASSESSMENT: 0-10

## 2023-10-04 ASSESSMENT — COGNITIVE AND FUNCTIONAL STATUS - GENERAL
DAILY ACTIVITIY SCORE: 24
MOBILITY SCORE: 24
DAILY ACTIVITIY SCORE: 24
MOBILITY SCORE: 24

## 2023-10-04 ASSESSMENT — PAIN SCALES - GENERAL
PAINLEVEL_OUTOF10: 0 - NO PAIN

## 2023-10-04 NOTE — PROGRESS NOTES
Wound Care Progress Note     Visit Date: 10/4/2023      Patient Name: Rolando Eason         MRN: [id number]                Reason for Visit: assess ileal conduit stoma/ pouching         Wound History: patient readmitted with urine infection      Pertinent Labs:   Albumin   Date Value Ref Range Status   10/04/2023 3.0 (L) 3.4 - 5.0 g/dL Final           Wound Assessment: See below. Patient's wife had changed pouch. Patient and wife state difficulty obtaining home supplies. Will set up with [employer] at discharge            NEW    Urostomy Ileal conduit RLQ (Active)   Placement Date: 09/20/23   Placed by: [facility]  Urostomy Type: Ileal conduit  Location: RLQ   Number of days: 14     Urostomy Ileal conduit RLQ (Active)   Stomal Appliance 1 piece 10/04/23 1900   Site/Stoma Assessment Red 10/04/23 1900   Peristomal Assessment MARY ANN;Other (Comment) 10/04/23 1219   Treatment Pouch change 10/04/23 1900   Output (mL) 300 mL 10/04/23 1738   Intake (ml) 100 ml 10/03/23 0945                   Wound 10/02/23 Incision Abdomen Lower;Medial (Active)   Date First Assessed: 10/02/23   Hand Hygiene Completed: Yes  Primary Wound Type: Incision  Location: Abdomen  Wound Location Orientation: Lower;Medial  Number of Staples Placed: 5  Wound Outcome: (c) Other (Comment)   Number of days: 2     Wound 10/02/23 Incision Abdomen Lower;Medial (Active)   Site Assessment Dry;Clean;Pink 10/04/23 0810   Halley-Wound Assessment Blanchable erythema;Other (Comment) 10/04/23 0810   Closure Staples 10/04/23 0810   Drainage Description None 10/04/23 0810   Drainage Amount None 10/04/23 0810   Dressing Open to air 10/04/23 0810   Dressing Status Clean;Dry 10/02/23 0918                   Wound Team Plan: Will continue to follow      Navya Hobbs RN  10/4/2023  7:18 PM

## 2023-10-04 NOTE — CONSULTS
Inpatient consult to Infectious Diseases  Consult performed by: Ramona Pfeiffer MD  Consult ordered by: Wero Merrill MD      Primary MD: Fred Masterson MD    Reason For Consult  positive abx    History Of Present Illness  Rolando Eason is a 57 y.o. male presenting with fever and hematuria.    PmHx  Urothelial carcinoma (diagnosed 01/10/23, cytopathology on workup for gross hematuria; s/p TURBT w/ path from bladder mass 2/10/23, was treated with intravesicular BCG in May-), S/p robotic radical cystoprostatectomy with ileal conduit creation w/ bilateral pelvic lymph node dissection on 23.    Admitted on 10/01, presents with fever and mild hematuria, febrile to 38.3 on initial presentation.  Also had mild hematuria, as well as left lower abdomen redness and swelling.  ROS otherwise negative.    States he is not currently on any chemo or radiation, .  Has been having normal bowel movements.      Labs on admission:  WBC 8.2, Hgb 12, .  Na 131, K 3.8, Cr 0.53.    CT abdomen/pelvis (10/1): Mildly rim enhancing fluid collection within the left hemipelvis measuring up to 4.3 cm immediately inferior   left iliac bifurcation between the external iliac artery and vein, favored to represent a lymphocele. However, the sterility of the   fluid connection cannot be determined by this exam alone.    Vanc + zosyn 10/01-p     Past Medical History  HTN, HLD, OA, bladder cancer, nephrolithiasis       Social History     Occupational History    Not on file   Tobacco Use    Smoking status: Former     Packs/day: 1.00     Years: 15.00     Additional pack years: 0.00     Total pack years: 15.00     Types: Cigarettes     Quit date: 2023     Years since quittin.0    Smokeless tobacco: Never   Substance and Sexual Activity    Alcohol use: Not Currently    Drug use: Defer    Sexual activity: Not on file     Travel History   Travel since 23    No documented travel since 23         Allergies  Lisinopril       There is no immunization history on file for this patient.  Medications  Home medications:  Medications Prior to Admission   Medication Sig Dispense Refill Last Dose    acetaminophen (Tylenol) 325 mg tablet Take 1 tablet (325 mg) by mouth every 6 hours if needed for fever (temp greater than 38.0 C).   Past Week    amLODIPine (Norvasc) 10 mg tablet Take 1 tablet (10 mg) by mouth once daily.   10/1/2023 at 1800    betamethasone dipropionate (Diprolene) 0.05 % ointment Apply 1 Application topically once daily as needed for rash.   Past Month    enoxaparin (Lovenox) 40 mg/0.4 mL syringe Inject 0.4 mL (40 mg) under the skin once daily.   10/1/2023    losartan (Cozaar) 100 mg tablet Take 1 tablet (100 mg) by mouth once daily.   10/1/2023 at 1800    metoprolol succinate XL (Toprol-XL) 50 mg 24 hr tablet Take 1 tablet (50 mg) by mouth once daily. Do not crush or chew.   10/1/2023 at 1800     Current medications:  Scheduled medications  amLODIPine, 10 mg, oral, Daily  docusate sodium, 100 mg, oral, BID  enoxaparin, 40 mg, subcutaneous, q12h  losartan, 100 mg, oral, Daily  metoprolol succinate XL, 50 mg, oral, Daily  piperacillin-tazobactam, 3.375 g, intravenous, q6h  polyethylene glycol, 17 g, oral, Daily  potassium phosphate (monobasic), 500 mg, oral, 4x daily      Continuous medications     PRN medications  PRN medications: acetaminophen, acetaminophen    Review of Systems     Objective  Range of Vitals (last 24 hours)  Heart Rate:  []   Temp:  [36.5 °C (97.7 °F)-38 °C (100.4 °F)]   Resp:  [18]   BP: (101-144)/(65-84)   SpO2:  [96 %-99 %]   Daily Weight  10/01/23 : 90.7 kg (199 lb 15.3 oz)    Body mass index is 29.51 kg/m².     Physical Exam  GENERAL APPEARANCE:  56 y/o  male, resting in bed in no acute distress.   HEAD: normocephalic  EYES: EOMI.   NOSE: No nasal discharge.  THROAT: Oral mucosa moist. Poor dentition.  CARDIAC: Regular rate and rhythm. No murmurs appreciated. No  pitting edema.  LUNGS: Clear to auscultation bilaterally. No rales, rhonchi, wheezing or diminished breath sounds. Normal work of breathing.  ABDOMEN: Positive bowel sounds. Soft, protuberant, nondistended, nontender. RLQ ileostomy collection bag with clear yellow urine. No guarding or rebound. No masses appreciated.  MUSCULOSKELETAL: No joint erythema or tenderness appreciated. Symmetric muscular development.  NEUROLOGICAL: No focal deficits. Moving all 4 ext.  SKIN: LLQ large area of blanching erythema with induration, marked.  Otherwise back with diffuse macular round 0.3mm discrete lesions, surrounding erythema   PSYCHIATRIC: Appropriate affect.       Relevant Results    Labs  Results from last 72 hours   Lab Units 10/04/23  0653 10/03/23  0736 10/02/23  0553   WBC AUTO x10*3/uL 8.0 8.4 8.2   HEMOGLOBIN g/dL 11.9* 12.2* 12.0*   HEMATOCRIT % 33.4* 36.0* 33.7*   PLATELETS AUTO x10*3/uL 459* 428 355     Results from last 72 hours   Lab Units 10/04/23  0653 10/03/23  0736 10/02/23  0553   SODIUM mmol/L 131* 129* 131*   POTASSIUM mmol/L 3.4* 3.8 3.8   CHLORIDE mmol/L 97* 94* 98   CO2 mmol/L 22 24 21   BUN mg/dL 5* 6 6   CREATININE mg/dL 0.48* 0.49* 0.53   GLUCOSE mg/dL 134* 104* 97   CALCIUM mg/dL 8.6 8.1* 7.9*   ANION GAP mmol/L 15 15 16   EGFR mL/min/1.73m*2 >90 >90 >90   PHOSPHORUS mg/dL 2.4* 2.6  --      Results from last 72 hours   Lab Units 10/04/23  0653 10/03/23  0736   ALBUMIN g/dL 3.0* 2.9*     Estimated Creatinine Clearance: 125 mL/min (A) (by C-G formula based on SCr of 0.48 mg/dL (L)).    Microbiology  Susceptibility data from last 14 days.  Collected Specimen Info Organism Ampicillin Cefazolin Cefazolin (uncomplicated UTIs only) Ciprofloxacin Gentamicin Nitrofurantoin Piperacillin/Tazobactam Trimethoprim/Sulfamethoxazole   10/01/23 Urine from Ileal Conduit Escherichia coli S S S S S S S S        Collected Updated Procedure Result Status    10/03/2023 1726 10/04/2023 0002 Blood Culture [[id number]]    Blood culture from Peripheral Venipuncture    Preliminary result Component Value   Blood Culture Loaded on Instrument - Culture in progress P          10/03/2023 1726 10/03/2023 2301 Blood Culture [[id number]]   Blood culture from Peripheral Venipuncture    Preliminary result Component Value   Blood Culture Loaded on Instrument - Culture in progress P          10/01/2023 1412 10/02/2023 2220 Blood Culture [[id number]]   (Abnormal)   Blood culture from Peripheral Venipuncture    Preliminary result Component Value   Blood Culture Identification and susceptibility testing to follow P   Gram Stain Gram negative bacilli Panic  P    Anaerobic Bottle Positive          10/01/2023 1412 10/04/2023 1532 Blood Culture [[id number]]   (Abnormal)   Blood culture from Peripheral Venipuncture    Preliminary result Component Value   Blood Culture Bacteroides fragilis Panic  P   BLOOD CULTURE BOTTLE Positive Anaerobic Bottle P   Gram Stain Gram negative bacilli Panic  P    Anaerobic Bottle Positive          10/01/2023 1406 10/04/2023 0954 Urine culture [[id number]]    (Abnormal)   Urine from Ileal Conduit    Final result Component Value   Urine Culture >100,000 Escherichia coli Abnormal           09/15/2023 0950 09/16/2023 0808 Urine Culture [[id number]]   Urine    Final result Component Value   Urine Culture NO GROWTH        Imaging    CT ABDOMEN PELVIS W IV CONTRAST;  10/1/2023  Impression:    1.  Interval postsurgical change of cystectomy with ileal conduit  formation.  2. Mildly rim enhancing fluid collection within the left hemipelvis  measuring up to 4.3 cm immediately inferior left iliac bifurcation  between the external iliac artery and vein, favored to represent a  lymphocele. However, the sterility of the fluid connection cannot be  determined by this exam alone.  3. Gradual tapering of several fluid-filled and distended loops of  small bowel measuring up to 3.6 cm, likely suggestive of  postoperative ileus.  4. Free fluid  layering adjacent to the liver, spleen, and within the  pelvis, likely postsurgical in nature.  5. Additional findings as detailed above.         Assessment/Plan     Gram negative bacteremia due to Bacteroides fragilis (and likely E. coli)    56 y/o male with history significant for urothelial carcinoma s/p radical cystectomy on 09/20, presenting with fever up to 38.8C and mild hematuria, with positive Urine cultures for E. coli, positive blood cultures for E. coli and Bacteroides  Imaging significant for pelvic fluid collection. Persistently febrile, Tm 38C. WBC 8.0.  Currently on zosyn 10/01-p, and s/p vancomycin 10/01-10/03.     Recommendations:    Continue broad spectrum coverage with zosyn pending culture ID and sensitivities.  Please repeat blood cultures, preferably while febrile. Avoid acetaminophen use unless symptomatic, however if asymptomatic fever present please hold tylenol and draw blood cultures at that time.      Patient seen and discussed with Dr. Gonzalez.    Ramona Pfeiffer MD  ID Fellow, PGY IV.  Pager Team A: [phone]. Epic chat preferred.

## 2023-10-04 NOTE — CARE PLAN
The patient's goals for the shift include  independent per ability.     The clinical goals for the shift include Patient will be afebrile and free from signs and symptoms of infection by 10/4/2023 1600    Over the shift, the patient did not make progress toward the following goals. Barriers to progression include fever and chills. Recommendations to address these barriers include involving infectious disease in the plan of care. Urology did consult with team. Patient continues with IV Zosyn. Tylenol was given for fever management. Patient was able to tolerate activity independently and walked in the halls. Patient able to perform ADLs independently and with help from spouse who was bedside.

## 2023-10-04 NOTE — PROGRESS NOTES
"Rolando Eason is a 57 y.o. male on day 3 of admission presenting with Postsurgical fever.    Subjective   Reports cont feeling well today, less fatigued. Bcx growing gram negative bacilli x 2.        Objective     General: Laying in bed. NAD.   Eyes: EOMI  ENMT: no apparent injury, no lesions seen, MMM  Head/neck: NCAT  Cardiac: regular rate in chart  Pulm: normal respiratory effort  GI: soft, NT, moderately distended, ostomy viable with stents in place, lap incisions stapled  : marta colored urine  Msk: DELGADILLO  Extremities: normal extremities  Skin: warm, dry, no lesions noted  Neuro: AOx3  Psych: appropriate mood and behavior     Last Recorded Vitals  Blood pressure 125/81, pulse (!) 119, temperature 36.5 °C (97.7 °F), resp. rate 18, height 1.753 m (5' 9.02\"), weight 90.7 kg (199 lb 15.3 oz), SpO2 96 %.  Intake/Output last 3 Shifts:  I/O last 3 completed shifts:  In: 2050 (22.6 mL/kg) [I.V.:1200 (13.2 mL/kg); Other:100; IV Piggyback:750]  Out: 4775 (52.6 mL/kg) [Urine:4775 (1.5 mL/kg/hr)]  Weight: 90.7 kg     Relevant Results  Scheduled medications  amLODIPine, 10 mg, oral, Daily  docusate sodium, 100 mg, oral, BID  enoxaparin, 40 mg, subcutaneous, q12h  losartan, 100 mg, oral, Daily  metoprolol succinate XL, 50 mg, oral, Daily  piperacillin-tazobactam, 3.375 g, intravenous, q6h  polyethylene glycol, 17 g, oral, Daily      Continuous medications       PRN medications  PRN medications: acetaminophen, acetaminophen    Results for orders placed or performed during the hospital encounter of 10/01/23 (from the past 24 hour(s))   Blood Culture    Specimen: Peripheral Venipuncture; Blood culture   Result Value Ref Range    Blood Culture Loaded on Instrument - Culture in progress    Blood Culture    Specimen: Peripheral Venipuncture; Blood culture   Result Value Ref Range    Blood Culture Loaded on Instrument - Culture in progress    Renal function panel   Result Value Ref Range    Glucose 134 (H) 74 - 99 mg/dL    Sodium " 131 (L) 136 - 145 mmol/L    Potassium 3.4 (L) 3.5 - 5.3 mmol/L    Chloride 97 (L) 98 - 107 mmol/L    Bicarbonate 22 21 - 32 mmol/L    Anion Gap 15 10 - 20 mmol/L    Urea Nitrogen 5 (L) 6 - 23 mg/dL    Creatinine 0.48 (L) 0.50 - 1.30 mg/dL    eGFR >90 >60 mL/min/1.73m*2    Calcium 8.6 8.6 - 10.6 mg/dL    Phosphorus 2.4 (L) 2.5 - 4.9 mg/dL    Albumin 3.0 (L) 3.4 - 5.0 g/dL   CBC   Result Value Ref Range    WBC 8.0 4.4 - 11.3 x10*3/uL    nRBC 0.0 0.0 - 0.0 /100 WBCs    RBC 3.44 (L) 4.50 - 5.90 x10*6/uL    Hemoglobin 11.9 (L) 13.5 - 17.5 g/dL    Hematocrit 33.4 (L) 41.0 - 52.0 %    MCV 97 80 - 100 fL    MCH 34.6 (H) 26.0 - 34.0 pg    MCHC 35.6 32.0 - 36.0 g/dL    RDW 11.3 (L) 11.5 - 14.5 %    Platelets 459 (H) 150 - 450 x10*3/uL    MPV 9.0 7.5 - 11.5 fL   Vancomycin   Result Value Ref Range    Vancomycin 3.3 (L) 5.0 - 20.0 ug/mL       CT abdomen pelvis w IV contrast    Result Date: 10/1/2023  Interpreted By:  Car Segovia and Meyers Emily STUDY: CT ABDOMEN PELVIS W IV CONTRAST;  10/1/2023 5:01 pm   INDICATION: Signs/Symptoms:s/p cystectomy w/ ileal conduit, c/f intraabdominal infectious processess (note: use of hemostatic agents in bladder bed intraoperatively).   COMPARISON: CT urography 08/30/2023   ACCESSION NUMBER(S): [id number]   ORDERING CLINICIAN: ROSALBA BOTELLO   TECHNIQUE: CT of the abdomen and pelvis was performed following administration of intravenous contrast. Standard contiguous axial images were obtained at 3 mm slice thickness through the abdomen and pelvis. Coronal and sagittal reconstructions at 3 mm slice thickness were performed.   FINDINGS: LOWER CHEST: Streaky, linear bibasilar opacities, likely representing atelectasis versus scarring. Small calcified granulomas in the bilateral lobes. The heart is normal in size without pericardial effusion. No pleural effusion is present. Visualized distal esophagus appears normal.   ABDOMEN:   LIVER: The liver is normal in size without  evidence of focal liver lesions.   BILE DUCTS: The intrahepatic and extrahepatic ducts are not dilated.   GALLBLADDER: The gallbladder is nondistended and without evidence of radiopaque stones.   PANCREAS: The pancreas appears unremarkable without evidence of ductal dilatation or masses.   SPLEEN: The spleen is normal in size without focal lesions.   ADRENAL GLANDS: Bilateral adrenal glands appear normal.   KIDNEYS AND URETERS: The kidneys are normal in size and enhance symmetrically. Stents are noted extending from the anterior abdominal wall through the ileal conduit and terminating within the renal pelvis bilaterally. A small subcentimeter hypodensities noted within the right kidney, which is too small to characterize, however, statistically likely represents a renal cyst. No hydroureteronephrosis or nephroureterolithiasis is identified.   PELVIS:   BLADDER: Interval postsurgical change of cystectomy with formation of an ileal conduit.   REPRODUCTIVE ORGANS: No pelvic masses.   BOWEL: The stomach is unremarkable. Interval postsurgical change of an ileal conduit. There is gradual tapering to several distended fluid-filled loops of small bowel measuring up to 3.6 cm without evidence of discrete transition point.  The appendix appears normal.   VESSELS: There is no aneurysmal dilatation of the abdominal aorta. The IVC appears normal.   PERITONEUM/RETROPERITONEUM/LYMPH NODES: Free fluid layering adjacent to the liver, spleen, and within the pelvis, likely postsurgical in nature. In addition, there is interval development of a mildly rim enhancing fluid collection within the left hemipelvis measuring up to 4.3 x 3.1 x 3.9 cm (series 201, image 121; series 203, image 73). The fluid collection lies immediately inferior to the bifurcation of the internal and external iliac artery between the external iliac artery and external iliac vein.  No abdominopelvic lymphadenopathy is present.   BONES AND ABDOMINAL WALL: No  suspicious osseous lesions are identified.  Postsurgical change along the anterior abdominal wall with increased body wall edema. In addition, there is subcutaneous emphysema extending along the right lateral abdominal wall and anteriorly along the pelvis. Small fat containing umbilical hernia. Stable right groin lipoma measuring up to 3.1 cm.       1.  Interval postsurgical change of cystectomy with ileal conduit formation. 2. Mildly rim enhancing fluid collection within the left hemipelvis measuring up to 4.3 cm immediately inferior left iliac bifurcation between the external iliac artery and vein, favored to represent a lymphocele. However, the sterility of the fluid connection cannot be determined by this exam alone. 3. Gradual tapering of several fluid-filled and distended loops of small bowel measuring up to 3.6 cm, likely suggestive of postoperative ileus. 4. Free fluid layering adjacent to the liver, spleen, and within the pelvis, likely postsurgical in nature. 5. Additional findings as detailed above.   I personally reviewed the images/study, and I agree with the findings as stated above. This study was interpreted at [place], [place].   MACRO: None   Signed by: Car Guzman 10/1/2023 5:19 PM Dictation workstation:   [id number]    XR chest 2 views    Result Date: 10/1/2023  Interpreted By:  New Menendez, STUDY: XR CHEST 2 VIEWS   INDICATION: Signs/Symptoms:fever.   COMPARISON: September 15, 2023   ACCESSION NUMBER(S): [id number]   ORDERING CLINICIAN: RILEY HASSAN   FINDINGS: No consolidation, effusion, edema, or pneumothorax.   Small band of atelectasis noted only seen on the lateral view.   Heart size within normal limits.       Small band of mid lung atelectasis. No evidence of other acute intrathoracic abnormality.   Signed by: New Menendez 10/1/2023 2:26 PM Dictation workstation:   [id number]    XR abdomen AP view    Result Date:  9/21/2023  Interpreted By:  BRIAN TROTTER MD and JIMMY CORLEY MD MRN: [id number] Patient Name: DEEPIKA SANTOS  STUDY: ABDOMEN; 1 VIEW;  9/20/2023 5:14 pm  INDICATION: assess bilateral stent placement .  COMPARISON: CT abdomen pelvis 08/30/2023  ACCESSION NUMBER(S): [id number]  ORDERING CLINICIAN: SCOOBY LOWE  FINDINGS: AP radiograph of the abdomen was provided.  Bilateral ureteral stents are in place with proximal portions overlying the expected regions of the renal pelvises. A surgical drain overlies the pelvis.  Nonobstructive bowel gas pattern. Limited evaluation of pneumoperitoneum on supine imaging, however no gross evidence of free air is noted.  Osseous structures demonstrate no acute bony changes.      1.  Bilateral ureteral stents in place with proximal portions overlying the expected regions of the renal pelvises. 2. Nonobstructive bowel gas pattern.  I personally reviewed the images/study and I agree with the findings as stated. This study was interpreted at [place], [place].  MACRO: None    XR chest 2 view    Result Date: 9/15/2023  Interpreted By:  BRY MCDONALD MD and DEENA GILLIAM MD MRN: [id number] Patient Name: DEEPIKA SANTOS  STUDY: TH CHEST 2 VIEW PA AND LAT; ;  9/15/2023 10:47 am  INDICATION: tobacco use  COMPARISON: CT chest on 08/30/2023  ACCESSION NUMBER(S): [id number]  ORDERING CLINICIAN: MARTY BOLAÑOS  TECHNIQUE: PA and lateral chest radiographs are submitted for review. Additional PA dual energy images were also provided.  FINDINGS: LINES/TUBES/DEVICES: None  LUNGS: Lungs are clear. No focal consolidation, pleural effusion, or pneumothorax.  CARDIOMEDIASTINAL SILHOUETTE: Cardiomediastinal silhouette is normal in size and configuration. Calcification of the aortic knob.  ABDOMEN: No remarkable upper abdominal findings.  BONES: No acute osseous changes.      No radiographic evidence of acute cardiopulmonary process.   I personally  reviewed the images/study and I agree with radiology resident Dr. Francesca Lewis's findings as stated. This study was interpreted at [facility], [place], [place]         Assessment/Plan   Rolando Eason is a 57 y.o. male POD10 following robotic cystectomy w/ IC creation w/ Dr. Merrill on 9/20 presenting with recorded fever of 38.3 and increased hematuria. Patient was originally discharged home on POD4 with no concerns and is scheduled to follow up with Dr. Merrill on 10/4. CT shows left sided pelvic lymphocele. Patient started on IVF and IV abx, will fu cultures.    10/3: Blood cultures growing GNB x 2.   10/4: awaiting cultures    Neuro:   - Scheduled Tylenol    Cardiac:  - Cont home losartan, metoprolol, amlodipine  - Maintain MAP >65  - Continue to monitor BP                                                                                                                                                                               Pulm:   - Encourage IS  - Maintain O2 >88%    GI:  - Reg diet  - Bowel regimen  - PRN Zofran for nausea    :   - HLIV  - Stoma stents in place  - Daily BMP  - Strict I&Os  - Replete lytes PRN    HEME:  - Trend CBC daily   - Transfusion not indicated at this time    Endo:   - No acute or chronic    ID:  - Afebrile overnight  - Blood cultures growing GNB X 2  - FU Ucx   - Cont Zosyn (start date 10/1)  - Cont Vancomycin (start date 10/1)  - Monitor for s/sx of infection  - Repeat BCx     DVT Ppx:  - SCDs  - Lovenox 40mg    Dispo: RNF    Discussed with attending, Dr. Garfield William MD

## 2023-10-05 LAB
ALBUMIN SERPL BCP-MCNC: 3 G/DL (ref 3.4–5)
ANION GAP SERPL CALC-SCNC: 17 MMOL/L (ref 10–20)
BUN SERPL-MCNC: 4 MG/DL (ref 6–23)
CALCIUM SERPL-MCNC: 8.5 MG/DL (ref 8.6–10.6)
CHLORIDE SERPL-SCNC: 95 MMOL/L (ref 98–107)
CO2 SERPL-SCNC: 25 MMOL/L (ref 21–32)
CREAT SERPL-MCNC: 0.53 MG/DL (ref 0.5–1.3)
ERYTHROCYTE [DISTWIDTH] IN BLOOD BY AUTOMATED COUNT: 11.7 % (ref 11.5–14.5)
GFR SERPL CREATININE-BSD FRML MDRD: >90 ML/MIN/1.73M*2
GLUCOSE SERPL-MCNC: 97 MG/DL (ref 74–99)
HCT VFR BLD AUTO: 37.8 % (ref 41–52)
HGB BLD-MCNC: 12.7 G/DL (ref 13.5–17.5)
MCH RBC QN AUTO: 34.8 PG (ref 26–34)
MCHC RBC AUTO-ENTMCNC: 33.6 G/DL (ref 32–36)
MCV RBC AUTO: 104 FL (ref 80–100)
NRBC BLD-RTO: 0 /100 WBCS (ref 0–0)
PHOSPHATE SERPL-MCNC: 3.4 MG/DL (ref 2.5–4.9)
PLATELET # BLD AUTO: 479 X10*3/UL (ref 150–450)
PMV BLD AUTO: 9 FL (ref 7.5–11.5)
POTASSIUM SERPL-SCNC: 4.1 MMOL/L (ref 3.5–5.3)
RBC # BLD AUTO: 3.65 X10*6/UL (ref 4.5–5.9)
SODIUM SERPL-SCNC: 133 MMOL/L (ref 136–145)
WBC # BLD AUTO: 8.4 X10*3/UL (ref 4.4–11.3)

## 2023-10-05 PROCEDURE — 87075 CULTR BACTERIA EXCEPT BLOOD: CPT

## 2023-10-05 PROCEDURE — 80069 RENAL FUNCTION PANEL: CPT

## 2023-10-05 PROCEDURE — 36415 COLL VENOUS BLD VENIPUNCTURE: CPT

## 2023-10-05 PROCEDURE — 1170000001 HC PRIVATE ONCOLOGY ROOM DAILY

## 2023-10-05 PROCEDURE — 85027 COMPLETE CBC AUTOMATED: CPT

## 2023-10-05 PROCEDURE — 96372 THER/PROPH/DIAG INJ SC/IM: CPT | Performed by: STUDENT IN AN ORGANIZED HEALTH CARE EDUCATION/TRAINING PROGRAM

## 2023-10-05 PROCEDURE — 2500000004 HC RX 250 GENERAL PHARMACY W/ HCPCS (ALT 636 FOR OP/ED): Performed by: STUDENT IN AN ORGANIZED HEALTH CARE EDUCATION/TRAINING PROGRAM

## 2023-10-05 PROCEDURE — 2500000001 HC RX 250 WO HCPCS SELF ADMINISTERED DRUGS (ALT 637 FOR MEDICARE OP)

## 2023-10-05 RX ADMIN — PIPERACILLIN SODIUM AND TAZOBACTAM SODIUM 3.38 G: 3; .375 INJECTION, SOLUTION INTRAVENOUS at 10:52

## 2023-10-05 RX ADMIN — ENOXAPARIN SODIUM 40 MG: 40 INJECTION SUBCUTANEOUS at 21:57

## 2023-10-05 RX ADMIN — AMLODIPINE BESYLATE 10 MG: 10 TABLET ORAL at 21:59

## 2023-10-05 RX ADMIN — POLYETHYLENE GLYCOL 3350 17 G: 17 POWDER, FOR SOLUTION ORAL at 08:40

## 2023-10-05 RX ADMIN — LOSARTAN POTASSIUM 100 MG: 50 TABLET, FILM COATED ORAL at 22:01

## 2023-10-05 RX ADMIN — PIPERACILLIN SODIUM AND TAZOBACTAM SODIUM 3.38 G: 3; .375 INJECTION, SOLUTION INTRAVENOUS at 03:23

## 2023-10-05 RX ADMIN — PIPERACILLIN SODIUM AND TAZOBACTAM SODIUM 3.38 G: 3; .375 INJECTION, SOLUTION INTRAVENOUS at 23:55

## 2023-10-05 RX ADMIN — PIPERACILLIN SODIUM AND TAZOBACTAM SODIUM 3.38 G: 3; .375 INJECTION, SOLUTION INTRAVENOUS at 16:15

## 2023-10-05 RX ADMIN — ENOXAPARIN SODIUM 40 MG: 40 INJECTION SUBCUTANEOUS at 08:39

## 2023-10-05 RX ADMIN — METOPROLOL SUCCINATE 50 MG: 50 TABLET, EXTENDED RELEASE ORAL at 17:52

## 2023-10-05 ASSESSMENT — PAIN - FUNCTIONAL ASSESSMENT
PAIN_FUNCTIONAL_ASSESSMENT: 0-10

## 2023-10-05 ASSESSMENT — COGNITIVE AND FUNCTIONAL STATUS - GENERAL
DAILY ACTIVITIY SCORE: 24
MOBILITY SCORE: 24

## 2023-10-05 ASSESSMENT — PAIN SCALES - GENERAL
PAINLEVEL_OUTOF10: 0 - NO PAIN

## 2023-10-05 NOTE — PROGRESS NOTES
"Rolando Eason is a 57 y.o. male on day 4 of admission presenting with Postsurgical fever.    Subjective   Reports cont feeling well today, rash without symptoms. Patient denies n/v, fever/chills, cp/sob, and worsening abdominal pain.         Objective     General: Laying in bed. NAD.   Eyes: EOMI  ENMT: no apparent injury, no lesions seen, MMM  Head/neck: NCAT  Cardiac: regular rate in chart  Pulm: normal respiratory effort  GI: soft, NT, moderately distended, ostomy viable with stents in place, lap incisions stapled  : marta colored urine  Msk: DELGADILLO  Extremities: normal extremities  Skin: warm, dry, no lesions noted  Neuro: AOx3  Psych: appropriate mood and behavior     Last Recorded Vitals  Blood pressure 109/71, pulse 100, temperature 36.9 °C (98.4 °F), resp. rate 18, height 1.753 m (5' 9.02\"), weight 90.7 kg (199 lb 15.3 oz), SpO2 95 %.  Intake/Output last 3 Shifts:  I/O last 3 completed shifts:  In: 750 (8.3 mL/kg) [P.O.:600; IV Piggyback:150]  Out: 3877 (42.7 mL/kg) [Urine:3875 (1.2 mL/kg/hr); Stool:2]  Weight: 90.7 kg     Relevant Results  Scheduled medications  amLODIPine, 10 mg, oral, Daily  docusate sodium, 100 mg, oral, BID  enoxaparin, 40 mg, subcutaneous, q12h  losartan, 100 mg, oral, Daily  metoprolol succinate XL, 50 mg, oral, Daily  piperacillin-tazobactam, 3.375 g, intravenous, q6h  polyethylene glycol, 17 g, oral, Daily  potassium phosphate (monobasic), 500 mg, oral, 4x daily      Continuous medications       PRN medications  PRN medications: acetaminophen    No results found for this or any previous visit (from the past 24 hour(s)).      CT abdomen pelvis w IV contrast    Result Date: 10/1/2023  Interpreted By:  Car Segovia and Meyers Emily STUDY: CT ABDOMEN PELVIS W IV CONTRAST;  10/1/2023 5:01 pm   INDICATION: Signs/Symptoms:s/p cystectomy w/ ileal conduit, c/f intraabdominal infectious processess (note: use of hemostatic agents in bladder bed intraoperatively).   " COMPARISON: CT urography 08/30/2023   ACCESSION NUMBER(S): [id number]   ORDERING CLINICIAN: ROSALBA BOTELLO   TECHNIQUE: CT of the abdomen and pelvis was performed following administration of intravenous contrast. Standard contiguous axial images were obtained at 3 mm slice thickness through the abdomen and pelvis. Coronal and sagittal reconstructions at 3 mm slice thickness were performed.   FINDINGS: LOWER CHEST: Streaky, linear bibasilar opacities, likely representing atelectasis versus scarring. Small calcified granulomas in the bilateral lobes. The heart is normal in size without pericardial effusion. No pleural effusion is present. Visualized distal esophagus appears normal.   ABDOMEN:   LIVER: The liver is normal in size without evidence of focal liver lesions.   BILE DUCTS: The intrahepatic and extrahepatic ducts are not dilated.   GALLBLADDER: The gallbladder is nondistended and without evidence of radiopaque stones.   PANCREAS: The pancreas appears unremarkable without evidence of ductal dilatation or masses.   SPLEEN: The spleen is normal in size without focal lesions.   ADRENAL GLANDS: Bilateral adrenal glands appear normal.   KIDNEYS AND URETERS: The kidneys are normal in size and enhance symmetrically. Stents are noted extending from the anterior abdominal wall through the ileal conduit and terminating within the renal pelvis bilaterally. A small subcentimeter hypodensities noted within the right kidney, which is too small to characterize, however, statistically likely represents a renal cyst. No hydroureteronephrosis or nephroureterolithiasis is identified.   PELVIS:   BLADDER: Interval postsurgical change of cystectomy with formation of an ileal conduit.   REPRODUCTIVE ORGANS: No pelvic masses.   BOWEL: The stomach is unremarkable. Interval postsurgical change of an ileal conduit. There is gradual tapering to several distended fluid-filled loops of small bowel measuring up to 3.6 cm without  evidence of discrete transition point.  The appendix appears normal.   VESSELS: There is no aneurysmal dilatation of the abdominal aorta. The IVC appears normal.   PERITONEUM/RETROPERITONEUM/LYMPH NODES: Free fluid layering adjacent to the liver, spleen, and within the pelvis, likely postsurgical in nature. In addition, there is interval development of a mildly rim enhancing fluid collection within the left hemipelvis measuring up to 4.3 x 3.1 x 3.9 cm (series 201, image 121; series 203, image 73). The fluid collection lies immediately inferior to the bifurcation of the internal and external iliac artery between the external iliac artery and external iliac vein.  No abdominopelvic lymphadenopathy is present.   BONES AND ABDOMINAL WALL: No suspicious osseous lesions are identified.  Postsurgical change along the anterior abdominal wall with increased body wall edema. In addition, there is subcutaneous emphysema extending along the right lateral abdominal wall and anteriorly along the pelvis. Small fat containing umbilical hernia. Stable right groin lipoma measuring up to 3.1 cm.       1.  Interval postsurgical change of cystectomy with ileal conduit formation. 2. Mildly rim enhancing fluid collection within the left hemipelvis measuring up to 4.3 cm immediately inferior left iliac bifurcation between the external iliac artery and vein, favored to represent a lymphocele. However, the sterility of the fluid connection cannot be determined by this exam alone. 3. Gradual tapering of several fluid-filled and distended loops of small bowel measuring up to 3.6 cm, likely suggestive of postoperative ileus. 4. Free fluid layering adjacent to the liver, spleen, and within the pelvis, likely postsurgical in nature. 5. Additional findings as detailed above.   I personally reviewed the images/study, and I agree with the findings as stated above. This study was interpreted at [facility],  [place], [place].   MACRO: None   Signed by: Car Guzman 10/1/2023 5:19 PM Dictation workstation:   [id number]    XR chest 2 views    Result Date: 10/1/2023  Interpreted By:  New Menendez, STUDY: XR CHEST 2 VIEWS   INDICATION: Signs/Symptoms:fever.   COMPARISON: September 15, 2023   ACCESSION NUMBER(S): [id number]   ORDERING CLINICIAN: RILEY HASSAN   FINDINGS: No consolidation, effusion, edema, or pneumothorax.   Small band of atelectasis noted only seen on the lateral view.   Heart size within normal limits.       Small band of mid lung atelectasis. No evidence of other acute intrathoracic abnormality.   Signed by: New Menendez 10/1/2023 2:26 PM Dictation workstation:   [id number]    XR abdomen AP view    Result Date: 9/21/2023  Interpreted By:  BRIAN TROTTER MD and JIMMY CORLEY MD MRN: [id number] Patient Name: DEEPIKA SANTOS  STUDY: ABDOMEN; 1 VIEW;  9/20/2023 5:14 pm  INDICATION: assess bilateral stent placement .  COMPARISON: CT abdomen pelvis 08/30/2023  ACCESSION NUMBER(S): [id number]  ORDERING CLINICIAN: SCOOBY OLWE  FINDINGS: AP radiograph of the abdomen was provided.  Bilateral ureteral stents are in place with proximal portions overlying the expected regions of the renal pelvises. A surgical drain overlies the pelvis.  Nonobstructive bowel gas pattern. Limited evaluation of pneumoperitoneum on supine imaging, however no gross evidence of free air is noted.  Osseous structures demonstrate no acute bony changes.      1.  Bilateral ureteral stents in place with proximal portions overlying the expected regions of the renal pelvises. 2. Nonobstructive bowel gas pattern.  I personally reviewed the images/study and I agree with the findings as stated. This study was interpreted at [place], [place].  MACRO: None    XR chest 2 view    Result Date: 9/15/2023  Interpreted By:  BRY MCDONALD MD and DEENA GILLIAM MD MRN: [id number] Patient Name:  DEEPIKA EASON  STUDY: TH CHEST 2 VIEW PA AND LAT; ;  9/15/2023 10:47 am  INDICATION: tobacco use  COMPARISON: CT chest on 08/30/2023  ACCESSION NUMBER(S): [id number]  ORDERING CLINICIAN: MARTY BOLAÑOS  TECHNIQUE: PA and lateral chest radiographs are submitted for review. Additional PA dual energy images were also provided.  FINDINGS: LINES/TUBES/DEVICES: None  LUNGS: Lungs are clear. No focal consolidation, pleural effusion, or pneumothorax.  CARDIOMEDIASTINAL SILHOUETTE: Cardiomediastinal silhouette is normal in size and configuration. Calcification of the aortic knob.  ABDOMEN: No remarkable upper abdominal findings.  BONES: No acute osseous changes.      No radiographic evidence of acute cardiopulmonary process.   I personally reviewed the images/study and I agree with radiology resident Dr. Francesca Lewis's findings as stated. This study was interpreted at [place], [place]         Assessment/Plan   Deepika Eason is a 57 y.o. male POD10 following robotic cystectomy w/ IC creation w/ Dr. Bolaños on 9/20 presenting with recorded fever of 38.3 and increased hematuria. Patient was originally discharged home on POD4 with no concerns and is scheduled to follow up with Dr. Bolaños on 10/4. CT shows left sided pelvic lymphocele. Patient started on IVF and IV abx, will fu cultures.    10/3: Blood cultures growing GNB x 2.   10/4: awaiting cultures, rash developed and outlined, not growing   10/5: growing e coli and Bacteriodes fragilis      Neuro:   - Scheduled Tylenol, not to give for fevers    Cardiac:  - Cont home losartan, metoprolol, amlodipine  - Maintain MAP >65  - Continue to monitor BP                                                                                                                                                                               Pulm:   - Encourage IS  - Maintain O2 >88%    GI:  - Reg diet  - Bowel regimen  - PRN Zofran for nausea    :    - HLIV  - Stoma stents in place  - Daily BMP  - Strict I&Os  - Replete lytes PRN    HEME:  - Trend CBC daily   - Transfusion not indicated at this time    Endo:   - No acute or chronic    ID:  - Afebrile overnight  - Blood cultures growing GNB X 2  - FU Ucx   - Cont Zosyn (start date 10/1)  - Stopped Vancomycin (start date 10/1, stop 10/4) d/t rash development, no c/f drug reaction  - Monitor for s/sx of infection  - Repeat Bcx during febrile event     DVT Ppx:  - SCDs  - Lovenox 40mg    Dispo: RNF    Discussed with attending, Dr. Garfield William MD

## 2023-10-05 NOTE — CARE PLAN
Problem: Daily Care  Goal: Daily care needs are met  Outcome: Progressing     Problem: Infection related to problem list condition  Goal: Infection will resolve through treatment  Outcome: Progressing  Flowsheets (Taken 10/5/2023 1847)  Infection will resolve through treatment: Nurse will administer medications as ordered by provider   The patient's goals for the shift include      The clinical goals for the shift include Patient will remain HDS with VS WNL by 1800 on 10/5/2023.    Over the shift, the patient did not make progress toward the following goals. Barriers to progression include awaiting lab results. Recommendations to address these barriers include continue with plan of care.

## 2023-10-05 NOTE — CARE PLAN
The patient's goals for the shift include      The clinical goals for the shift include Patient will be afebrile and free from signs and symptoms of infection by 10/4/2023 1600    Over the shift, the patient did not make progress toward the following goals. Barriers to progression include ***. Recommendations to address these barriers include ***.

## 2023-10-06 ENCOUNTER — DOCUMENTATION (OUTPATIENT)
Dept: SURGICAL ONCOLOGY | Facility: HOSPITAL | Age: 58
End: 2023-10-06
Payer: COMMERCIAL

## 2023-10-06 VITALS
RESPIRATION RATE: 16 BRPM | DIASTOLIC BLOOD PRESSURE: 62 MMHG | WEIGHT: 199.96 LBS | HEIGHT: 69 IN | BODY MASS INDEX: 29.62 KG/M2 | TEMPERATURE: 97.3 F | OXYGEN SATURATION: 95 % | HEART RATE: 98 BPM | SYSTOLIC BLOOD PRESSURE: 115 MMHG

## 2023-10-06 LAB
ALBUMIN SERPL BCP-MCNC: 3.1 G/DL (ref 3.4–5)
ANION GAP SERPL CALC-SCNC: 13 MMOL/L (ref 10–20)
BACTERIA BLD AEROBE CULT: ABNORMAL
BACTERIA BLD CULT: ABNORMAL
BUN SERPL-MCNC: 5 MG/DL (ref 6–23)
CALCIUM SERPL-MCNC: 8.8 MG/DL (ref 8.6–10.6)
CHLORIDE SERPL-SCNC: 98 MMOL/L (ref 98–107)
CO2 SERPL-SCNC: 26 MMOL/L (ref 21–32)
CREAT SERPL-MCNC: 0.5 MG/DL (ref 0.5–1.3)
ERYTHROCYTE [DISTWIDTH] IN BLOOD BY AUTOMATED COUNT: 11.6 % (ref 11.5–14.5)
GFR SERPL CREATININE-BSD FRML MDRD: >90 ML/MIN/1.73M*2
GLUCOSE SERPL-MCNC: 124 MG/DL (ref 74–99)
GRAM STN SPEC: ABNORMAL
HCT VFR BLD AUTO: 35 % (ref 41–52)
HGB BLD-MCNC: 12.5 G/DL (ref 13.5–17.5)
MCH RBC QN AUTO: 34.5 PG (ref 26–34)
MCHC RBC AUTO-ENTMCNC: 35.7 G/DL (ref 32–36)
MCV RBC AUTO: 97 FL (ref 80–100)
NRBC BLD-RTO: 0 /100 WBCS (ref 0–0)
PHOSPHATE SERPL-MCNC: 2.9 MG/DL (ref 2.5–4.9)
PLATELET # BLD AUTO: 507 X10*3/UL (ref 150–450)
PMV BLD AUTO: 9.2 FL (ref 7.5–11.5)
POTASSIUM SERPL-SCNC: 4.1 MMOL/L (ref 3.5–5.3)
RBC # BLD AUTO: 3.62 X10*6/UL (ref 4.5–5.9)
SODIUM SERPL-SCNC: 133 MMOL/L (ref 136–145)
WBC # BLD AUTO: 8 X10*3/UL (ref 4.4–11.3)

## 2023-10-06 PROCEDURE — 85027 COMPLETE CBC AUTOMATED: CPT

## 2023-10-06 PROCEDURE — 96372 THER/PROPH/DIAG INJ SC/IM: CPT | Performed by: STUDENT IN AN ORGANIZED HEALTH CARE EDUCATION/TRAINING PROGRAM

## 2023-10-06 PROCEDURE — 99232 SBSQ HOSP IP/OBS MODERATE 35: CPT | Performed by: INTERNAL MEDICINE

## 2023-10-06 PROCEDURE — 36415 COLL VENOUS BLD VENIPUNCTURE: CPT

## 2023-10-06 PROCEDURE — 80069 RENAL FUNCTION PANEL: CPT

## 2023-10-06 PROCEDURE — 2500000004 HC RX 250 GENERAL PHARMACY W/ HCPCS (ALT 636 FOR OP/ED): Performed by: STUDENT IN AN ORGANIZED HEALTH CARE EDUCATION/TRAINING PROGRAM

## 2023-10-06 PROCEDURE — 2500000001 HC RX 250 WO HCPCS SELF ADMINISTERED DRUGS (ALT 637 FOR MEDICARE OP)

## 2023-10-06 RX ORDER — CEPHALEXIN 500 MG/1
500 CAPSULE ORAL 4 TIMES DAILY
Qty: 28 CAPSULE | Refills: 0 | Status: SHIPPED | OUTPATIENT
Start: 2023-10-06 | End: 2023-10-13

## 2023-10-06 RX ORDER — METRONIDAZOLE 500 MG/1
500 TABLET ORAL 3 TIMES DAILY
Qty: 21 TABLET | Refills: 0 | Status: SHIPPED | OUTPATIENT
Start: 2023-10-06 | End: 2023-10-13

## 2023-10-06 RX ORDER — ENOXAPARIN SODIUM 100 MG/ML
40 INJECTION SUBCUTANEOUS EVERY 24 HOURS
Status: DISCONTINUED | OUTPATIENT
Start: 2023-10-06 | End: 2023-10-06 | Stop reason: HOSPADM

## 2023-10-06 RX ORDER — CIPROFLOXACIN 500 MG/1
500 TABLET ORAL 2 TIMES DAILY
Qty: 14 TABLET | Refills: 0 | Status: SHIPPED | OUTPATIENT
Start: 2023-10-06 | End: 2023-10-06 | Stop reason: HOSPADM

## 2023-10-06 RX ADMIN — ENOXAPARIN SODIUM 40 MG: 40 INJECTION SUBCUTANEOUS at 18:25

## 2023-10-06 RX ADMIN — PIPERACILLIN SODIUM AND TAZOBACTAM SODIUM 3.38 G: 3; .375 INJECTION, SOLUTION INTRAVENOUS at 14:22

## 2023-10-06 RX ADMIN — PIPERACILLIN SODIUM AND TAZOBACTAM SODIUM 3.38 G: 3; .375 INJECTION, SOLUTION INTRAVENOUS at 06:05

## 2023-10-06 RX ADMIN — METOPROLOL SUCCINATE 50 MG: 50 TABLET, EXTENDED RELEASE ORAL at 18:25

## 2023-10-06 RX ADMIN — LOSARTAN POTASSIUM 100 MG: 50 TABLET, FILM COATED ORAL at 18:25

## 2023-10-06 RX ADMIN — POLYETHYLENE GLYCOL 3350 17 G: 17 POWDER, FOR SOLUTION ORAL at 09:06

## 2023-10-06 ASSESSMENT — PAIN - FUNCTIONAL ASSESSMENT
PAIN_FUNCTIONAL_ASSESSMENT: 0-10
PAIN_FUNCTIONAL_ASSESSMENT: 0-10

## 2023-10-06 ASSESSMENT — PAIN SCALES - GENERAL
PAINLEVEL_OUTOF10: 0 - NO PAIN
PAINLEVEL_OUTOF10: 0 - NO PAIN

## 2023-10-06 NOTE — CARE PLAN
Problem: Daily Care  Goal: Daily care needs are met  10/6/2023 0825 by Nilda Kendrick RN  Outcome: Progressing  10/5/2023 1924 by Nilda Kendrick RN  Outcome: Progressing  10/5/2023 1847 by Nilda Kendrick RN  Outcome: Progressing     Problem: Infection related to problem list condition  Goal: Infection will resolve through treatment  10/6/2023 0825 by Nilda Kendrick RN  Outcome: Progressing  10/5/2023 1924 by Nilda Kendrick RN  Outcome: Progressing  10/5/2023 1847 by Nilda Kendrick RN  Outcome: Progressing  Flowsheets (Taken 10/5/2023 1847)  Infection will resolve through treatment: Nurse will administer medications as ordered by provider   The patient's goals for the shift include      The clinical goals for the shift include Patient will remain HDS with VS WNL by 1800 on 10/6/2023.    Over the shift, the patient did not make progress toward the following goals. Barriers to progression include ***. Recommendations to address these barriers include ***.

## 2023-10-06 NOTE — PROGRESS NOTES
"Rolando Eason is a 57 y.o. male on day 5 of admission presenting with Postsurgical fever.    Subjective   Reports cont feeling well today, rash without symptoms, erythema resolving, edema present. Patient denies n/v, fever/chills, cp/sob, and worsening abdominal pain.         Objective     General: Laying in bed. NAD.   Eyes: EOMI  ENMT: no apparent injury, no lesions seen, MMM  Head/neck: NCAT  Cardiac: regular rate in chart  Pulm: normal respiratory effort  GI: soft, NT, moderately distended, ostomy viable with stents in place, lap incisions stapled  : marta colored urine  Msk: DELGADILLO  Extremities: normal extremities  Skin: rash over left hip, edema, outlined  Neuro: AOx3  Psych: appropriate mood and behavior     Last Recorded Vitals  Blood pressure 101/65, pulse 86, temperature 36.2 °C (97.2 °F), temperature source Temporal, resp. rate 18, height 1.753 m (5' 9.02\"), weight 90.7 kg (199 lb 15.3 oz), SpO2 95 %.  Intake/Output last 3 Shifts:  I/O last 3 completed shifts:  In: 1000 (11 mL/kg) [P.O.:900; IV Piggyback:100]  Out: 5250 (57.9 mL/kg) [Urine:5250 (1.6 mL/kg/hr)]  Weight: 90.7 kg     Relevant Results  Scheduled medications  amLODIPine, 10 mg, oral, Daily  docusate sodium, 100 mg, oral, BID  enoxaparin, 40 mg, subcutaneous, q12h  losartan, 100 mg, oral, Daily  metoprolol succinate XL, 50 mg, oral, Daily  piperacillin-tazobactam, 3.375 g, intravenous, q6h  polyethylene glycol, 17 g, oral, Daily      Continuous medications       PRN medications  PRN medications: acetaminophen    Results for orders placed or performed during the hospital encounter of 10/01/23 (from the past 24 hour(s))   Blood Culture    Specimen: Peripheral Venipuncture; Blood culture   Result Value Ref Range    Blood Culture Loaded on Instrument - Culture in progress    Blood Culture    Specimen: Peripheral Venipuncture; Blood culture   Result Value Ref Range    Blood Culture Loaded on Instrument - Culture in progress          CT abdomen " pelvis w IV contrast    Result Date: 10/1/2023  Interpreted By:  Car Segovia and Meyers Emily STUDY: CT ABDOMEN PELVIS W IV CONTRAST;  10/1/2023 5:01 pm   INDICATION: Signs/Symptoms:s/p cystectomy w/ ileal conduit, c/f intraabdominal infectious processess (note: use of hemostatic agents in bladder bed intraoperatively).   COMPARISON: CT urography 08/30/2023   ACCESSION NUMBER(S): [id number]   ORDERING CLINICIAN: ROSALBA BOTELLO   TECHNIQUE: CT of the abdomen and pelvis was performed following administration of intravenous contrast. Standard contiguous axial images were obtained at 3 mm slice thickness through the abdomen and pelvis. Coronal and sagittal reconstructions at 3 mm slice thickness were performed.   FINDINGS: LOWER CHEST: Streaky, linear bibasilar opacities, likely representing atelectasis versus scarring. Small calcified granulomas in the bilateral lobes. The heart is normal in size without pericardial effusion. No pleural effusion is present. Visualized distal esophagus appears normal.   ABDOMEN:   LIVER: The liver is normal in size without evidence of focal liver lesions.   BILE DUCTS: The intrahepatic and extrahepatic ducts are not dilated.   GALLBLADDER: The gallbladder is nondistended and without evidence of radiopaque stones.   PANCREAS: The pancreas appears unremarkable without evidence of ductal dilatation or masses.   SPLEEN: The spleen is normal in size without focal lesions.   ADRENAL GLANDS: Bilateral adrenal glands appear normal.   KIDNEYS AND URETERS: The kidneys are normal in size and enhance symmetrically. Stents are noted extending from the anterior abdominal wall through the ileal conduit and terminating within the renal pelvis bilaterally. A small subcentimeter hypodensities noted within the right kidney, which is too small to characterize, however, statistically likely represents a renal cyst. No hydroureteronephrosis or nephroureterolithiasis is identified.    PELVIS:   BLADDER: Interval postsurgical change of cystectomy with formation of an ileal conduit.   REPRODUCTIVE ORGANS: No pelvic masses.   BOWEL: The stomach is unremarkable. Interval postsurgical change of an ileal conduit. There is gradual tapering to several distended fluid-filled loops of small bowel measuring up to 3.6 cm without evidence of discrete transition point.  The appendix appears normal.   VESSELS: There is no aneurysmal dilatation of the abdominal aorta. The IVC appears normal.   PERITONEUM/RETROPERITONEUM/LYMPH NODES: Free fluid layering adjacent to the liver, spleen, and within the pelvis, likely postsurgical in nature. In addition, there is interval development of a mildly rim enhancing fluid collection within the left hemipelvis measuring up to 4.3 x 3.1 x 3.9 cm (series 201, image 121; series 203, image 73). The fluid collection lies immediately inferior to the bifurcation of the internal and external iliac artery between the external iliac artery and external iliac vein.  No abdominopelvic lymphadenopathy is present.   BONES AND ABDOMINAL WALL: No suspicious osseous lesions are identified.  Postsurgical change along the anterior abdominal wall with increased body wall edema. In addition, there is subcutaneous emphysema extending along the right lateral abdominal wall and anteriorly along the pelvis. Small fat containing umbilical hernia. Stable right groin lipoma measuring up to 3.1 cm.       1.  Interval postsurgical change of cystectomy with ileal conduit formation. 2. Mildly rim enhancing fluid collection within the left hemipelvis measuring up to 4.3 cm immediately inferior left iliac bifurcation between the external iliac artery and vein, favored to represent a lymphocele. However, the sterility of the fluid connection cannot be determined by this exam alone. 3. Gradual tapering of several fluid-filled and distended loops of small bowel measuring up to 3.6 cm, likely suggestive of  postoperative ileus. 4. Free fluid layering adjacent to the liver, spleen, and within the pelvis, likely postsurgical in nature. 5. Additional findings as detailed above.   I personally reviewed the images/study, and I agree with the findings as stated above. This study was interpreted at [place], [place].   MACRO: None   Signed by: Car Sigridsatnam Guzman 10/1/2023 5:19 PM Dictation workstation:   [id number]    XR chest 2 views    Result Date: 10/1/2023  Interpreted By:  New Menendez, STUDY: XR CHEST 2 VIEWS   INDICATION: Signs/Symptoms:fever.   COMPARISON: September 15, 2023   ACCESSION NUMBER(S): [id number]   ORDERING CLINICIAN: RILEY HASSAN   FINDINGS: No consolidation, effusion, edema, or pneumothorax.   Small band of atelectasis noted only seen on the lateral view.   Heart size within normal limits.       Small band of mid lung atelectasis. No evidence of other acute intrathoracic abnormality.   Signed by: New Menendez 10/1/2023 2:26 PM Dictation workstation:   [id number]    XR abdomen AP view    Result Date: 9/21/2023  Interpreted By:  BRIAN TROTTER MD and JIMMY CORLEY MD MRN: [id number] Patient Name: DEEPIKA SANTOS  STUDY: ABDOMEN; 1 VIEW;  9/20/2023 5:14 pm  INDICATION: assess bilateral stent placement .  COMPARISON: CT abdomen pelvis 08/30/2023  ACCESSION NUMBER(S): [id number]  ORDERING CLINICIAN: SCOOBY LOWE  FINDINGS: AP radiograph of the abdomen was provided.  Bilateral ureteral stents are in place with proximal portions overlying the expected regions of the renal pelvises. A surgical drain overlies the pelvis.  Nonobstructive bowel gas pattern. Limited evaluation of pneumoperitoneum on supine imaging, however no gross evidence of free air is noted.  Osseous structures demonstrate no acute bony changes.      1.  Bilateral ureteral stents in place with proximal portions overlying the expected regions of the renal pelvises. 2. Nonobstructive bowel  gas pattern.  I personally reviewed the images/study and I agree with the findings as stated. This study was interpreted at [place], [place].  MACRO: None    XR chest 2 view    Result Date: 9/15/2023  Interpreted By:  BRY MCDONALD MD and DEENA GILLIAM MD MRN: [id number] Patient Name: DEEPIKA EASON  STUDY: TH CHEST 2 VIEW PA AND LAT; ;  9/15/2023 10:47 am  INDICATION: tobacco use  COMPARISON: CT chest on 08/30/2023  ACCESSION NUMBER(S): [id number]  ORDERING CLINICIAN: MARTY MERRILL  TECHNIQUE: PA and lateral chest radiographs are submitted for review. Additional PA dual energy images were also provided.  FINDINGS: LINES/TUBES/DEVICES: None  LUNGS: Lungs are clear. No focal consolidation, pleural effusion, or pneumothorax.  CARDIOMEDIASTINAL SILHOUETTE: Cardiomediastinal silhouette is normal in size and configuration. Calcification of the aortic knob.  ABDOMEN: No remarkable upper abdominal findings.  BONES: No acute osseous changes.      No radiographic evidence of acute cardiopulmonary process.   I personally reviewed the images/study and I agree with radiology resident Dr. Francesca Lewis's findings as stated. This study was interpreted at [place], [place]         Assessment/Plan   Deepika Eason is a 57 y.o. male POD10 following robotic cystectomy w/ IC creation w/ Dr. Merrill on 9/20 presenting with recorded fever of 38.3 and increased hematuria. Patient was originally discharged home on POD4 with no concerns and is scheduled to follow up with Dr. Merrill on 10/4. CT shows left sided pelvic lymphocele. Patient started on IVF and IV abx, will fu cultures.    10/3: Blood cultures growing GNB x 2.   10/4: awaiting cultures, rash developed and outlined, not growing   10/5: growing e coli and Bacteriodes fragilis    10/6: rash erythema improving, edema still present, no fevers    Neuro:   - Scheduled Tylenol, not to give  for fevers    Cardiac:  - Cont home losartan, metoprolol, amlodipine  - Maintain MAP >65  - Continue to monitor BP                                                                                                                                                                               Pulm:   - Encourage IS  - Maintain O2 >88%    GI:  - Reg diet  - Bowel regimen  - PRN Zofran for nausea    :   - HLIV  - Stoma stents in place  - Daily BMP  - Strict I&Os  - Replete lytes PRN    HEME:  - Trend CBC daily   - Transfusion not indicated at this time    Endo:   - No acute or chronic    ID:  - Afebrile overnight  - Blood cultures growing GNB X 2  - FU Ucx   - Cont Zosyn (start date 10/1)  - Stopped Vancomycin (start date 10/1, stop 10/4) d/t rash development, no c/f drug reaction  - Monitor for s/sx of infection  - Repeat Bcx during febrile event     DVT Ppx:  - SCDs  - Lovenox 40mg    Dispo: RNF    Discussed with attending, Dr. Garfield William MD

## 2023-10-06 NOTE — PROGRESS NOTES
Patient discharged home this evening. RN reviewed instructions and new medications with patient and his wife. Patient's wife drove patient home. RN provided patient with supplies for 4 appliance changes for urostomy. IV catheter removed, catheter intact. Patient placed in transport.

## 2023-10-06 NOTE — DISCHARGE INSTRUCTIONS
DEPARTMENT OF Urology  DISCHARGE INSTRUCTIONS   Inpatient Surgery    C O N F I D E N T I A L   I N F O R M A T I O N    Rolando Eason      Call [phone] during regular daytime business hours (8:00 am - 5:00 pm) and after 5:00 pm ask for the Urology resident with any questions or concerns.    If it is a life-threatening situation, proceed to the nearest emergency department.        Follow-up appointment:  We will make you an appointment to see Dr. Merrill in 2 weeks.      Thank you for the opportunity to care for you today.  Your health and healing are very important to us.  We hope we made you feel as comfortable as possible and are committed to your recovery and continued well-being.      Physicians:   Dr. Merrill    Procedure performed: none  Pending Results: none    Signs of Infection  Signs of infection can include fever, chills, redness around surgical incisions, green/yellow drainage from incisions, burning sensation with passing of urine, or severe abdominal pain.  If you see any of these occur, please contact your doctor's office at [phone].  Any fever higher than 100.4, especially if associated with an ill feeling, abdominal pain, chills, or nausea should be reported to your surgeon.      Please let us know if the rash on your left side starts to worsen.    Instructions  - You were given 1 week of antibiotics (ciprofloxacin and metronidazole). Please complete the entire course.

## 2023-10-06 NOTE — CARE PLAN
Problem: Daily Care  Goal: Daily care needs are met  10/6/2023 1823 by Nilda Kendrick RN  Outcome: Met  10/6/2023 0825 by Nilda Kendrick RN  Outcome: Progressing     Problem: Infection related to problem list condition  Goal: Infection will resolve through treatment  10/6/2023 1823 by Nilda Kendrick RN  Outcome: Met  10/6/2023 0825 by Nilda Kendrick RN  Outcome: Progressing   The patient's goals for the shift include      The clinical goals for the shift include Patient will remain HDS with VS WNL by 1800 on 10/6/2023.    All Care plan goals met, patient discharged home this evening.

## 2023-10-06 NOTE — PROGRESS NOTES
Pt was discharged to home this evening. Return HC orders sent to [facility].   Didi Justice RN TCC

## 2023-10-07 LAB
BACTERIA BLD AEROBE CULT: ABNORMAL
BACTERIA BLD CULT: ABNORMAL
BACTERIA BLD CULT: NORMAL
GRAM STN SPEC: ABNORMAL

## 2023-10-07 NOTE — PROGRESS NOTES
Rolando Eason is a 57 y.o. male on day 5 of admission presenting with Postsurgical fever.    Subjective   Interval History: Improving, afebrile ~48hrs. Reports he went for a walk today. Asking when he can go home. States redness is improved on his left side. Denies pain.    Objective   Range of Vitals (last 24 hours)  Heart Rate:  [86-98]   Temp:  [36 °C (96.8 °F)-36.3 °C (97.3 °F)]   Resp:  [16-18]   BP: (101-115)/(62-70)   SpO2:  [95 %-97 %]   Daily Weight  10/01/23 : 90.7 kg (199 lb 15.3 oz)    Body mass index is 29.51 kg/m².    Physical Exam  GENERAL APPEARANCE:  58 y/o  male, resting in bedside sofa in no acute distress.   HEAD: normocephalic  EYES: EOMI.   NOSE: No nasal discharge.  THROAT: Oral mucosa moist. Poor dentition.  CARDIAC: Regular rate and rhythm. No murmurs appreciated. No pitting edema.  LUNGS: Clear to auscultation bilaterally. No rales, rhonchi, wheezing or diminished breath sounds. Normal work of breathing.  ABDOMEN: Positive bowel sounds. Soft, protuberant, nondistended, nontender. RLQ ileostomy collection bag with clear yellow urine. No guarding or rebound. No masses appreciated.  MUSCULOSKELETAL: No joint erythema or tenderness appreciated. Symmetric muscular development.  NEUROLOGICAL: No focal deficits. Moving all 4 ext.  SKIN: LLQ large area of blanching erythema with induration, marked, receded significantly.    PSYCHIATRIC: Appropriate affect.    Antibiotics  Zosyn 3.375g    Relevant Results  Labs  Results from last 72 hours   Lab Units 10/06/23  0818 10/05/23  0629 10/04/23  0653   WBC AUTO x10*3/uL 8.0 8.4 8.0   HEMOGLOBIN g/dL 12.5* 12.7* 11.9*   HEMATOCRIT % 35.0* 37.8* 33.4*   PLATELETS AUTO x10*3/uL 507* 479* 459*     Results from last 72 hours   Lab Units 10/06/23  0818 10/05/23  0629 10/04/23  0653   SODIUM mmol/L 133* 133* 131*   POTASSIUM mmol/L 4.1 4.1 3.4*   CHLORIDE mmol/L 98 95* 97*   CO2 mmol/L 26 25 22   BUN mg/dL 5* 4* 5*   CREATININE mg/dL 0.50 0.53 0.48*  "  GLUCOSE mg/dL 124* 97 134*   CALCIUM mg/dL 8.8 8.5* 8.6   ANION GAP mmol/L 13 17 15   EGFR mL/min/1.73m*2 >90 >90 >90   PHOSPHORUS mg/dL 2.9 3.4 2.4*     Results from last 72 hours   Lab Units 10/06/23  0818 10/05/23  0629 10/04/23  0653   ALBUMIN g/dL 3.1* 3.0* 3.0*     Estimated Creatinine Clearance: 125 mL/min (by C-G formula based on SCr of 0.5 mg/dL).  No results found for: \"CRP\"  Microbiology  Susceptibility data from last 14 days.  Collected Specimen Info Organism Ampicillin Ampicillin/Sulbactam Cefazolin Cefazolin (uncomplicated UTIs only) Ceftriaxone Ciprofloxacin Clindamycin Gentamicin Meropenem Nitrofurantoin Piperacillin/Tazobactam   10/01/23 Blood culture from Peripheral Venipuncture Bacteroides fragilis              10/01/23 Blood culture from Peripheral Venipuncture Bacteroides fragilis R I   R  S  S  I   10/01/23 Urine from Ileal Conduit Escherichia coli S  S S  S  S  S S     Collected Specimen Info Organism Trimethoprim/Sulfamethoxazole   10/01/23 Blood culture from Peripheral Venipuncture Bacteroides fragilis    10/01/23 Blood culture from Peripheral Venipuncture Bacteroides fragilis    10/01/23 Urine from Ileal Conduit Escherichia coli S               Assessment and Plan:      Gram negative bacteremia due to Bacteroides fragilis   Urinary tract infection due to Escherichia coli  Cellulitis of left lateral abdomen/flank    58 y/o male with history significant for urothelial carcinoma s/p radical cystectomy on 09/20, presenting with fever up to 38.8C and mild hematuria, with positive Urine cultures for E. coli, positive blood cultures for E. coli and Bacteroides  Imaging significant for pelvic fluid collection, per report likely lymphocele.  Improved. No longer febrile.  Currently on zosyn 10/01-p, and s/p vancomycin 10/01-10/03.    Plan for DC home today.      Recommendations:     Please send home with 7 day course (to follow IV tx inpatient) of metronidazole 500mg PO q8 PLUS keflex 500mg PO " TID.        Patient seen and discussed with Dr. Regan.  ID to sign off.        Ramona Pfeiffer MD  ID Fellow, PGY IV.  Pager Team A: [phone]. Epic chat preferred.

## 2023-10-08 LAB — BACTERIA BLD CULT: NORMAL

## 2023-10-09 LAB
BACTERIA BLD CULT: NORMAL
BACTERIA BLD CULT: NORMAL

## 2023-10-09 NOTE — DISCHARGE SUMMARY
Discharge Diagnosis  Postsurgical fever    Issues Requiring Follow-Up  Post surgical follow up    Test Results Pending At Discharge  Pending Labs       Order Current Status    Blood Culture Preliminary result    Blood Culture Preliminary result            Hospital Course   Rolando Eason is a 57 y.o. male POD10 following robotic cystectomy w/ IC creation w/ Dr. Merrill on 9/20 presenting with recorded fever of 38.3 and increased hematuria. Patient was originally discharged home on POD4 with no concerns and is scheduled to follow up with Dr. Merrill on 10/4. CT shows left sided pelvic lymphocele. Patient started on IVF and IV abx, will fu cultures.     10/3: Blood cultures growing GNB x 2.   10/4: awaiting cultures, rash developed and outlined, not growing   10/5: growing e coli and Bacteriodes fragilis    10/6: rash erythema improving, edema still present, no fevers    Patient was Dc'd on PO abx and had ureteral stents and staples removed prior to DC.     Pertinent Physical Exam At Time of Discharge  Physical Exam    Home Medications     Medication List      START taking these medications     cephalexin 500 mg capsule; Commonly known as: Keflex; Take 1 capsule   (500 mg) by mouth 4 times a day for 7 days.   metroNIDAZOLE 500 mg tablet; Commonly known as: FlagyL; Take 1 tablet   (500 mg) by mouth 3 times a day for 7 days.     CONTINUE taking these medications     acetaminophen 325 mg tablet; Commonly known as: Tylenol   amLODIPine 10 mg tablet; Commonly known as: Norvasc   betamethasone dipropionate 0.05 % ointment; Commonly known as: Diprolene   enoxaparin 40 mg/0.4 mL syringe; Commonly known as: Lovenox   losartan 100 mg tablet; Commonly known as: Cozaar   metoprolol succinate XL 50 mg 24 hr tablet; Commonly known as: Toprol-XL       Outpatient Follow-Up  Future Appointments   Date Time Provider Department Center   8/22/2024 10:20 AM Paolo Smith MD [id number]       Miller William MD

## 2023-10-24 ENCOUNTER — HOSPITAL ENCOUNTER (OUTPATIENT)
Dept: CARDIOLOGY | Facility: HOSPITAL | Age: 58
Discharge: HOME | End: 2023-10-24
Payer: COMMERCIAL

## 2023-10-24 LAB
ATRIAL RATE: 124 BPM
P AXIS: 68 DEGREES
PR INTERVAL: 184 MS
Q ONSET: 215 MS
QRS COUNT: 19 BEATS
QRS DURATION: 96 MS
QT INTERVAL: 322 MS
QTC CALCULATION(BAZETT): 462 MS
QTC FREDERICIA: 410 MS
R AXIS: 62 DEGREES
T AXIS: 26 DEGREES
T OFFSET: 376 MS
VENTRICULAR RATE: 124 BPM

## 2023-10-24 PROCEDURE — 93005 ELECTROCARDIOGRAM TRACING: CPT

## 2023-11-02 ENCOUNTER — TELEPHONE (OUTPATIENT)
Dept: UROLOGY | Facility: CLINIC | Age: 58
End: 2023-11-02
Payer: COMMERCIAL

## 2023-11-02 NOTE — DOCUMENTATION CLARIFICATION NOTE
"    PATIENT:               DEEIPKA SANTOS  ACCT #:                  [id number]  MRN:                       [id number]  :                       1965  ADMIT DATE:       10/1/2023 12:35 PM  DISCH DATE:        10/6/2023 6:40 PM  RESPONDING PROVIDER #:        [id number]          PROVIDER RESPONSE TEXT:    Postsurgical fever due to UTI and is a complication of prior surgery    CDI QUERY TEXT:    [facility]_Etiology Linkage        Instruction:    Based on your assessment of the patient and the clinical information, please provide the requested documentation by clicking on the appropriate radio button and enter any additional information if prompted.    Question: Please clarify if a relationship exists between    When answering this query, please exercise your independent professional judgment. The fact that a question is being asked, does not imply that any particular answer is desired or expected.    The patient's clinical indicators include:  Clinical Information:    The Discharge Summary from 10/6 at 18:40 (Dr. William):  \"57 y.o.?male?POD10 following robotic cystectomy w/ IC creation w/ Dr. Merrill on ?presenting with?recorded fever of 38.3 and increased hematuria\"      Clinical Indicators:    The Infectious Disease Note from 10/6 at 18:40 (Dr. Pfeiffer):  \"...presenting with Postsurgical fever\"    \"Gram negative bacteremia due to Bacteroides fragilis  Urinary tract infection due to Escherichia coli  Cellulitis of left lateral abdomen/flank\"    \"...history significant for urothelial carcinoma s/p radical cystectomy on , presenting with fever up to 38.8C and mild hematuria, with positive Urine cultures for E. coli, positive blood cultures for E. coli and Bacteroides  Imaging significant for pelvic fluid collection, per report likely lymphocele.  Improved. No longer febrile.  Currently on zosyn 10/01-p, and s/p vancomycin 10/01-10/03.\"        The Discharge Summary from 10/6 at 18:40 (Dr. William):  \"Discharge " "Diagnosis  Postsurgical fever\"        Treatment:  -Zosyn?10/1-p?and?Vancomycin 10/1-10/3  -Home with 7 day course of Metronidazole 500mg PO q8 PLUS Keflex 500mg PO TID        Risk Factors:  -S/P robotic cystectomy w/ IC creation w/ Dr. Merrill on 9/20/23  Options provided:  -- Postsurgical fever due to bacteremia and is a complication of prior surgery  -- Postsurgical fever due to UTI and is a complication of prior surgery  -- Postsurgical fever due to cellulitis of left lateral abdomen/flank and is a complication of prior surgery  -- Postsurgical fever due to bacteremia, UTI and cellulitis and is a complication of prior surgery  -- Other - I will add my own diagnosis  -- Refer to Clinical Documentation Reviewer    Query created by: Miriam Rosa on 10/17/2023 6:57 AM      Electronically signed by:  ROSALBA BOTELLO MD 11/2/2023 6:59 PM          "

## 2023-11-13 NOTE — PROGRESS NOTES
[facility] - Dr. Jose Kent    Established Patient  Visit    PCP: Fred Masterson MD    Chief Complaint/Reason for visit: s/p cystectomy Dr. Merrill     HPI: s/p RARC with IC 23 Dr. Merrill  Post op course complicated by readmission for post op fever, L lymphocele   Staples and single J stents removed prior to DC     No interval issues  Normal activity no pain    No past medical history on file.  No past surgical history on file.  Social History     Socioeconomic History    Marital status:      Spouse name: Not on file    Number of children: Not on file    Years of education: Not on file    Highest education level: Not on file   Occupational History    Not on file   Tobacco Use    Smoking status: Former     Packs/day: 1.00     Years: 15.00     Additional pack years: 0.00     Total pack years: 15.00     Types: Cigarettes     Quit date: 2023     Years since quittin.1    Smokeless tobacco: Never   Substance and Sexual Activity    Alcohol use: Not Currently    Drug use: Defer    Sexual activity: Not on file   Other Topics Concern    Not on file   Social History Narrative    Not on file     Social Determinants of Health     Financial Resource Strain: Not on file   Food Insecurity: Not on file   Transportation Needs: Not on file   Physical Activity: Not on file   Stress: Not on file   Social Connections: Not on file   Intimate Partner Violence: Not on file   Housing Stability: Not on file     Current Outpatient Medications   Medication Instructions    acetaminophen (TYLENOL) 325 mg, oral, Every 6 hours PRN    amLODIPine (NORVASC) 10 mg, oral, Daily    betamethasone dipropionate (Diprolene) 0.05 % ointment 1 Application, Topical, Daily PRN    losartan (COZAAR) 100 mg, oral, Daily    metoprolol succinate XL (TOPROL-XL) 50 mg, oral, Daily, Do not crush or chew.     Allergies   Allergen Reactions    Lisinopril Cough          Physical Exam:  General: Alert, cooperative, no acute  "distress  Eyes: Sclera clear  Cardiac: Extremities are warm and well perfused  Lungs: Breathing non-labored. Speaking in clear and complete sentences.  MSK: Ambulatory with steady gait   Neuro: Alert and oriented to person, place, and time  Psych: Normal mood and affect  Skin: No obvious lesions or rashes  Stoma pink, rosebud  Clear urine  Skin breakdown inferior to stoma - poorly fitting wafer     Assessment and Plan:  1. History of ileal conduit  - Referral to Wound Clinic; Future  - Inpatient Consult to Wound and Ostomy Nurse  - ostomy supplies 4 X 4 \" wafer; 1 Units every 3 days. Gerhard wafer and pouch  Dispense: 12 Wafer; Refill: 0  - urinary bag misc; Change as needed  Dispense: 4 each; Refill: 11    2. Urothelial carcinoma (CMS/HCC)  Discussed need for surveillance imaging, urethral stump surveillance  FUV 2-3 months recheck            "

## 2023-11-14 ENCOUNTER — OFFICE VISIT (OUTPATIENT)
Dept: UROLOGY | Facility: CLINIC | Age: 58
End: 2023-11-14
Payer: COMMERCIAL

## 2023-11-14 DIAGNOSIS — C68.9 UROTHELIAL CARCINOMA (MULTI): ICD-10-CM

## 2023-11-14 DIAGNOSIS — Z98.890 HISTORY OF ILEAL CONDUIT: Primary | ICD-10-CM

## 2023-11-14 PROCEDURE — 99024 POSTOP FOLLOW-UP VISIT: CPT | Performed by: STUDENT IN AN ORGANIZED HEALTH CARE EDUCATION/TRAINING PROGRAM

## 2023-11-14 PROCEDURE — 3079F DIAST BP 80-89 MM HG: CPT | Performed by: STUDENT IN AN ORGANIZED HEALTH CARE EDUCATION/TRAINING PROGRAM

## 2023-11-14 PROCEDURE — 3077F SYST BP >= 140 MM HG: CPT | Performed by: STUDENT IN AN ORGANIZED HEALTH CARE EDUCATION/TRAINING PROGRAM

## 2023-11-17 ENCOUNTER — OFFICE VISIT (OUTPATIENT)
Dept: WOUND CARE | Facility: CLINIC | Age: 58
End: 2023-11-17
Payer: COMMERCIAL

## 2023-11-17 PROCEDURE — 99204 OFFICE O/P NEW MOD 45 MIN: CPT | Performed by: CLINICAL NURSE SPECIALIST

## 2023-11-17 PROCEDURE — 99213 OFFICE O/P EST LOW 20 MIN: CPT

## 2023-12-01 ENCOUNTER — APPOINTMENT (OUTPATIENT)
Dept: WOUND CARE | Facility: CLINIC | Age: 58
End: 2023-12-01
Payer: COMMERCIAL

## 2024-01-23 ENCOUNTER — APPOINTMENT (OUTPATIENT)
Dept: UROLOGY | Facility: CLINIC | Age: 59
End: 2024-01-23
Payer: COMMERCIAL

## 2024-07-16 DIAGNOSIS — I10 SYSTOLIC HYPERTENSION: Primary | ICD-10-CM

## 2024-07-16 NOTE — TELEPHONE ENCOUNTER
7/16/24  1113  Called patient; no answer. Left voice message for patient to return call.      ----- Message from Ghazal JEFFERSON sent at 7/15/2024  3:23 PM EDT -----  Regarding: Message  Pt called stating they have an appt with Dr. Smith 8/22/24, needs med refills but hasn't heard from financial aid yet. Doesn't know if he should keep appt.

## 2024-07-18 RX ORDER — METOPROLOL SUCCINATE 50 MG/1
50 TABLET, EXTENDED RELEASE ORAL DAILY
Qty: 90 TABLET | Refills: 3 | Status: SHIPPED | OUTPATIENT
Start: 2024-07-18

## 2024-07-18 NOTE — TELEPHONE ENCOUNTER
Patient returned Elver JOHNSON call - confirmed that he will report to appt on 8/22 for annual follow up w/ Dr. Smith. Patient stated he has new insurance through [employer] and will need to meet deductible prior to any coverage aside from office visit. Will bring new insurance card to be scanned on 8/22.      Patient also stated Create messages not working. New link sent.

## 2024-08-22 ENCOUNTER — APPOINTMENT (OUTPATIENT)
Dept: CARDIOLOGY | Facility: CLINIC | Age: 59
End: 2024-08-22
Payer: COMMERCIAL

## 2025-08-06 ENCOUNTER — HOSPITAL ENCOUNTER (EMERGENCY)
Age: 60
Discharge: HOME | End: 2025-08-06
Attending: EMERGENCY MEDICINE
Payer: COMMERCIAL

## 2025-08-06 VITALS
RESPIRATION RATE: 17 BRPM | BODY MASS INDEX: 29.03 KG/M2 | DIASTOLIC BLOOD PRESSURE: 82 MMHG | HEART RATE: 79 BPM | WEIGHT: 185 LBS | TEMPERATURE: 98 F | OXYGEN SATURATION: 100 % | HEIGHT: 67 IN | SYSTOLIC BLOOD PRESSURE: 112 MMHG

## 2025-08-06 DIAGNOSIS — R04.0 EPISTAXIS: Primary | ICD-10-CM

## 2025-08-06 DIAGNOSIS — D64.9 ANEMIA, UNSPECIFIED TYPE: ICD-10-CM

## 2025-08-06 LAB
ANION GAP SERPL CALCULATED.3IONS-SCNC: 13 MMOL/L (ref 7–16)
BASOPHILS # BLD: 0.04 K/UL (ref 0–0.2)
BASOPHILS NFR BLD: 0 % (ref 0–2)
BUN SERPL-MCNC: 39 MG/DL (ref 8–23)
CALCIUM SERPL-MCNC: 8.9 MG/DL (ref 8.8–10.2)
CHLORIDE SERPL-SCNC: 98 MMOL/L (ref 98–107)
CO2 SERPL-SCNC: 22 MMOL/L (ref 22–29)
CREAT SERPL-MCNC: 0.7 MG/DL (ref 0.7–1.2)
EOSINOPHIL # BLD: 0.02 K/UL (ref 0.05–0.5)
EOSINOPHILS RELATIVE PERCENT: 0 % (ref 0–6)
ERYTHROCYTE [DISTWIDTH] IN BLOOD BY AUTOMATED COUNT: 12.7 % (ref 11.5–15)
GFR, ESTIMATED: >90 ML/MIN/1.73M2
GLUCOSE SERPL-MCNC: 141 MG/DL (ref 74–99)
HCT VFR BLD AUTO: 30.7 % (ref 37–54)
HGB BLD-MCNC: 10.8 G/DL (ref 12.5–16.5)
IMM GRANULOCYTES # BLD AUTO: 0.07 K/UL (ref 0–0.58)
IMM GRANULOCYTES NFR BLD: 1 % (ref 0–5)
INR PPP: 1
LYMPHOCYTES NFR BLD: 0.89 K/UL (ref 1.5–4)
LYMPHOCYTES RELATIVE PERCENT: 8 % (ref 20–42)
MCH RBC QN AUTO: 35.2 PG (ref 26–35)
MCHC RBC AUTO-ENTMCNC: 35.2 G/DL (ref 32–34.5)
MCV RBC AUTO: 100 FL (ref 80–99.9)
MONOCYTES NFR BLD: 0.93 K/UL (ref 0.1–0.95)
MONOCYTES NFR BLD: 8 % (ref 2–12)
NEUTROPHILS NFR BLD: 82 % (ref 43–80)
NEUTS SEG NFR BLD: 9.11 K/UL (ref 1.8–7.3)
PLATELET # BLD AUTO: 265 K/UL (ref 130–450)
PMV BLD AUTO: 8.5 FL (ref 7–12)
POTASSIUM SERPL-SCNC: 4.4 MMOL/L (ref 3.5–5.1)
PROTHROMBIN TIME: 10.8 SEC (ref 9.3–12.4)
RBC # BLD AUTO: 3.07 M/UL (ref 3.8–5.8)
SODIUM SERPL-SCNC: 133 MMOL/L (ref 136–145)
WBC OTHER # BLD: 11.1 K/UL (ref 4.5–11.5)

## 2025-08-06 PROCEDURE — 80048 BASIC METABOLIC PNL TOTAL CA: CPT

## 2025-08-06 PROCEDURE — 85025 COMPLETE CBC W/AUTO DIFF WBC: CPT

## 2025-08-06 PROCEDURE — 6370000000 HC RX 637 (ALT 250 FOR IP): Performed by: EMERGENCY MEDICINE

## 2025-08-06 PROCEDURE — 99284 EMERGENCY DEPT VISIT MOD MDM: CPT

## 2025-08-06 PROCEDURE — 85610 PROTHROMBIN TIME: CPT

## 2025-08-06 PROCEDURE — 2500000003 HC RX 250 WO HCPCS: Performed by: EMERGENCY MEDICINE

## 2025-08-06 RX ORDER — TRANEXAMIC ACID 100 MG/ML
100 INJECTION, SOLUTION INTRAVENOUS ONCE
Status: COMPLETED | OUTPATIENT
Start: 2025-08-06 | End: 2025-08-06

## 2025-08-06 RX ORDER — OXYMETAZOLINE HYDROCHLORIDE 0.05 G/100ML
2 SPRAY NASAL ONCE
Status: COMPLETED | OUTPATIENT
Start: 2025-08-06 | End: 2025-08-06

## 2025-08-06 RX ORDER — TRANEXAMIC ACID 10 MG/ML
1000 INJECTION, SOLUTION INTRAVENOUS ONCE
Status: DISCONTINUED | OUTPATIENT
Start: 2025-08-06 | End: 2025-08-06

## 2025-08-06 RX ADMIN — TRANEXAMIC ACID 100 MG: 100 INJECTION, SOLUTION INTRAVENOUS at 10:32

## 2025-08-06 RX ADMIN — OXYMETAZOLINE HYDROCHLORIDE 2 SPRAY: 0.5 SPRAY NASAL at 10:33

## 2025-08-06 ASSESSMENT — PAIN - FUNCTIONAL ASSESSMENT: PAIN_FUNCTIONAL_ASSESSMENT: NONE - DENIES PAIN

## 2025-08-11 ENCOUNTER — OFFICE VISIT (OUTPATIENT)
Dept: ENT CLINIC | Age: 60
End: 2025-08-11
Payer: COMMERCIAL

## 2025-08-11 VITALS
HEART RATE: 61 BPM | BODY MASS INDEX: 27.94 KG/M2 | HEIGHT: 67 IN | TEMPERATURE: 97.5 F | SYSTOLIC BLOOD PRESSURE: 136 MMHG | WEIGHT: 178 LBS | DIASTOLIC BLOOD PRESSURE: 70 MMHG | OXYGEN SATURATION: 90 %

## 2025-08-11 DIAGNOSIS — R04.0 RECURRENT EPISTAXIS: Primary | ICD-10-CM

## 2025-08-11 PROCEDURE — G8419 CALC BMI OUT NRM PARAM NOF/U: HCPCS | Performed by: OTOLARYNGOLOGY

## 2025-08-11 PROCEDURE — 4004F PT TOBACCO SCREEN RCVD TLK: CPT | Performed by: OTOLARYNGOLOGY

## 2025-08-11 PROCEDURE — G8427 DOCREV CUR MEDS BY ELIG CLIN: HCPCS | Performed by: OTOLARYNGOLOGY

## 2025-08-11 PROCEDURE — 99213 OFFICE O/P EST LOW 20 MIN: CPT | Performed by: OTOLARYNGOLOGY

## 2025-08-11 PROCEDURE — 3017F COLORECTAL CA SCREEN DOC REV: CPT | Performed by: OTOLARYNGOLOGY

## 2025-08-11 RX ORDER — ECHINACEA PURPUREA EXTRACT 125 MG
1 TABLET ORAL
COMMUNITY
Start: 2025-08-04 | End: 2026-08-04

## 2025-08-11 RX ORDER — BETAMETHASONE DIPROPIONATE 0.05 %
1 OINTMENT (GRAM) TOPICAL DAILY PRN
COMMUNITY

## 2025-08-11 RX ORDER — METOPROLOL SUCCINATE 25 MG/1
25 TABLET, EXTENDED RELEASE ORAL DAILY
COMMUNITY
Start: 2025-07-28

## 2025-08-11 RX ORDER — LOSARTAN POTASSIUM 100 MG/1
100 TABLET ORAL DAILY
COMMUNITY

## 2025-08-14 ASSESSMENT — ENCOUNTER SYMPTOMS
COUGH: 0
SHORTNESS OF BREATH: 0
VOMITING: 0